# Patient Record
Sex: FEMALE | Race: BLACK OR AFRICAN AMERICAN | NOT HISPANIC OR LATINO | Employment: UNEMPLOYED | RURAL
[De-identification: names, ages, dates, MRNs, and addresses within clinical notes are randomized per-mention and may not be internally consistent; named-entity substitution may affect disease eponyms.]

---

## 2021-07-25 VITALS — HEIGHT: 66 IN | BODY MASS INDEX: 47.09 KG/M2 | WEIGHT: 293 LBS

## 2021-07-25 DIAGNOSIS — D86.9 SARCOIDOSIS: Primary | ICD-10-CM

## 2021-07-25 RX ORDER — PREDNISONE 5 MG/1
20 TABLET ORAL EVERY OTHER DAY
COMMUNITY
End: 2021-08-02 | Stop reason: SDUPTHER

## 2021-07-25 RX ORDER — LEVOTHYROXINE SODIUM 25 UG/1
25 TABLET ORAL
COMMUNITY
End: 2024-02-06

## 2021-07-25 RX ORDER — POTASSIUM CHLORIDE 20 MEQ/1
20 TABLET, EXTENDED RELEASE ORAL 2 TIMES DAILY PRN
COMMUNITY
End: 2024-02-06 | Stop reason: SDUPTHER

## 2021-07-25 RX ORDER — FUROSEMIDE 20 MG/1
20 TABLET ORAL DAILY PRN
COMMUNITY

## 2021-07-25 RX ORDER — METOCLOPRAMIDE HYDROCHLORIDE 5 MG/1
10 TABLET, ORALLY DISINTEGRATING ORAL 4 TIMES DAILY
COMMUNITY
End: 2024-02-06 | Stop reason: SDUPTHER

## 2021-08-02 ENCOUNTER — HOSPITAL ENCOUNTER (OUTPATIENT)
Dept: RADIOLOGY | Facility: HOSPITAL | Age: 51
Discharge: HOME | End: 2021-08-02
Attending: INTERNAL MEDICINE
Payer: COMMERCIAL

## 2021-08-02 ENCOUNTER — OFFICE VISIT (OUTPATIENT)
Dept: PULMONOLOGY | Facility: CLINIC | Age: 51
End: 2021-08-02
Payer: COMMERCIAL

## 2021-08-02 VITALS
SYSTOLIC BLOOD PRESSURE: 130 MMHG | HEART RATE: 83 BPM | BODY MASS INDEX: 48.82 KG/M2 | WEIGHT: 293 LBS | DIASTOLIC BLOOD PRESSURE: 68 MMHG | RESPIRATION RATE: 18 BRPM | HEIGHT: 65 IN | OXYGEN SATURATION: 93 %

## 2021-08-02 DIAGNOSIS — D86.9 SARCOIDOSIS: ICD-10-CM

## 2021-08-02 DIAGNOSIS — D86.9 SARCOID: ICD-10-CM

## 2021-08-02 PROCEDURE — 1159F MED LIST DOCD IN RCRD: CPT | Mod: ,,, | Performed by: INTERNAL MEDICINE

## 2021-08-02 PROCEDURE — 1159F PR MEDICATION LIST DOCUMENTED IN MEDICAL RECORD: ICD-10-PCS | Mod: ,,, | Performed by: INTERNAL MEDICINE

## 2021-08-02 PROCEDURE — 1160F RVW MEDS BY RX/DR IN RCRD: CPT | Mod: ,,, | Performed by: INTERNAL MEDICINE

## 2021-08-02 PROCEDURE — 99213 PR OFFICE/OUTPT VISIT, EST, LEVL III, 20-29 MIN: ICD-10-PCS | Mod: S$PBB,,, | Performed by: INTERNAL MEDICINE

## 2021-08-02 PROCEDURE — 1126F PR PAIN SEVERITY QUANTIFIED, NO PAIN PRESENT: ICD-10-PCS | Mod: ,,, | Performed by: INTERNAL MEDICINE

## 2021-08-02 PROCEDURE — 3078F PR MOST RECENT DIASTOLIC BLOOD PRESSURE < 80 MM HG: ICD-10-PCS | Mod: ,,, | Performed by: INTERNAL MEDICINE

## 2021-08-02 PROCEDURE — 1160F PR REVIEW ALL MEDS BY PRESCRIBER/CLIN PHARMACIST DOCUMENTED: ICD-10-PCS | Mod: ,,, | Performed by: INTERNAL MEDICINE

## 2021-08-02 PROCEDURE — 71046 XR CHEST PA AND LATERAL: ICD-10-PCS | Mod: 26,,, | Performed by: RADIOLOGY

## 2021-08-02 PROCEDURE — 1126F AMNT PAIN NOTED NONE PRSNT: CPT | Mod: ,,, | Performed by: INTERNAL MEDICINE

## 2021-08-02 PROCEDURE — 99214 OFFICE O/P EST MOD 30 MIN: CPT | Mod: PBBFAC | Performed by: INTERNAL MEDICINE

## 2021-08-02 PROCEDURE — 3008F PR BODY MASS INDEX (BMI) DOCUMENTED: ICD-10-PCS | Mod: ,,, | Performed by: INTERNAL MEDICINE

## 2021-08-02 PROCEDURE — 3075F PR MOST RECENT SYSTOLIC BLOOD PRESS GE 130-139MM HG: ICD-10-PCS | Mod: ,,, | Performed by: INTERNAL MEDICINE

## 2021-08-02 PROCEDURE — 71046 X-RAY EXAM CHEST 2 VIEWS: CPT | Mod: TC

## 2021-08-02 PROCEDURE — 71046 X-RAY EXAM CHEST 2 VIEWS: CPT | Mod: 26,,, | Performed by: RADIOLOGY

## 2021-08-02 PROCEDURE — 3078F DIAST BP <80 MM HG: CPT | Mod: ,,, | Performed by: INTERNAL MEDICINE

## 2021-08-02 PROCEDURE — 99213 OFFICE O/P EST LOW 20 MIN: CPT | Mod: S$PBB,,, | Performed by: INTERNAL MEDICINE

## 2021-08-02 PROCEDURE — 3008F BODY MASS INDEX DOCD: CPT | Mod: ,,, | Performed by: INTERNAL MEDICINE

## 2021-08-02 PROCEDURE — 3075F SYST BP GE 130 - 139MM HG: CPT | Mod: ,,, | Performed by: INTERNAL MEDICINE

## 2021-08-02 RX ORDER — PREDNISONE 5 MG/1
20 TABLET ORAL EVERY OTHER DAY
Qty: 30 TABLET | Refills: 5 | Status: SHIPPED | OUTPATIENT
Start: 2021-08-02 | End: 2021-10-07

## 2021-08-02 RX ORDER — ALBUTEROL SULFATE 90 UG/1
2 AEROSOL, METERED RESPIRATORY (INHALATION) EVERY 6 HOURS PRN
Qty: 18 G | Refills: 5 | Status: SHIPPED | OUTPATIENT
Start: 2021-08-02 | End: 2024-02-06

## 2021-08-04 ENCOUNTER — OUTSIDE PLACE OF SERVICE (OUTPATIENT)
Dept: CARDIOLOGY | Facility: CLINIC | Age: 51
End: 2021-08-04
Payer: COMMERCIAL

## 2021-08-04 PROCEDURE — 93306 TTE W/DOPPLER COMPLETE: CPT | Mod: 26,,, | Performed by: INTERNAL MEDICINE

## 2021-08-04 PROCEDURE — 93306 PR ECHO HEART XTHORACIC,COMPLETE W DOPPLER: ICD-10-PCS | Mod: 26,,, | Performed by: INTERNAL MEDICINE

## 2021-10-07 ENCOUNTER — OFFICE VISIT (OUTPATIENT)
Dept: PULMONOLOGY | Facility: CLINIC | Age: 51
End: 2021-10-07
Payer: COMMERCIAL

## 2021-10-07 VITALS
HEART RATE: 89 BPM | DIASTOLIC BLOOD PRESSURE: 78 MMHG | SYSTOLIC BLOOD PRESSURE: 122 MMHG | BODY MASS INDEX: 48.82 KG/M2 | WEIGHT: 293 LBS | OXYGEN SATURATION: 98 % | RESPIRATION RATE: 22 BRPM | HEIGHT: 65 IN

## 2021-10-07 DIAGNOSIS — D86.9 SARCOID: Primary | ICD-10-CM

## 2021-10-07 PROCEDURE — 99213 PR OFFICE/OUTPT VISIT, EST, LEVL III, 20-29 MIN: ICD-10-PCS | Mod: S$PBB,,, | Performed by: INTERNAL MEDICINE

## 2021-10-07 PROCEDURE — 99214 OFFICE O/P EST MOD 30 MIN: CPT | Mod: PBBFAC | Performed by: INTERNAL MEDICINE

## 2021-10-07 PROCEDURE — 1159F MED LIST DOCD IN RCRD: CPT | Mod: ,,, | Performed by: INTERNAL MEDICINE

## 2021-10-07 PROCEDURE — 3008F PR BODY MASS INDEX (BMI) DOCUMENTED: ICD-10-PCS | Mod: ,,, | Performed by: INTERNAL MEDICINE

## 2021-10-07 PROCEDURE — 3078F DIAST BP <80 MM HG: CPT | Mod: ,,, | Performed by: INTERNAL MEDICINE

## 2021-10-07 PROCEDURE — 3074F SYST BP LT 130 MM HG: CPT | Mod: ,,, | Performed by: INTERNAL MEDICINE

## 2021-10-07 PROCEDURE — 1159F PR MEDICATION LIST DOCUMENTED IN MEDICAL RECORD: ICD-10-PCS | Mod: ,,, | Performed by: INTERNAL MEDICINE

## 2021-10-07 PROCEDURE — 3008F BODY MASS INDEX DOCD: CPT | Mod: ,,, | Performed by: INTERNAL MEDICINE

## 2021-10-07 PROCEDURE — 3074F PR MOST RECENT SYSTOLIC BLOOD PRESSURE < 130 MM HG: ICD-10-PCS | Mod: ,,, | Performed by: INTERNAL MEDICINE

## 2021-10-07 PROCEDURE — 99213 OFFICE O/P EST LOW 20 MIN: CPT | Mod: S$PBB,,, | Performed by: INTERNAL MEDICINE

## 2021-10-07 PROCEDURE — 3078F PR MOST RECENT DIASTOLIC BLOOD PRESSURE < 80 MM HG: ICD-10-PCS | Mod: ,,, | Performed by: INTERNAL MEDICINE

## 2021-10-07 RX ORDER — PREDNISONE 5 MG/1
20 TABLET ORAL DAILY
Qty: 30 TABLET | Refills: 5 | Status: SHIPPED | OUTPATIENT
Start: 2021-10-07 | End: 2022-03-07

## 2021-11-05 ENCOUNTER — OFFICE VISIT (OUTPATIENT)
Dept: DERMATOLOGY | Facility: CLINIC | Age: 51
End: 2021-11-05
Payer: COMMERCIAL

## 2021-11-05 VITALS — HEIGHT: 64 IN | BODY MASS INDEX: 46.26 KG/M2 | WEIGHT: 271 LBS | RESPIRATION RATE: 18 BRPM

## 2021-11-05 DIAGNOSIS — Z79.899 HIGH RISK MEDICATION USE: ICD-10-CM

## 2021-11-05 DIAGNOSIS — D86.3 CUTANEOUS SARCOIDOSIS: Primary | ICD-10-CM

## 2021-11-05 PROCEDURE — 1160F PR REVIEW ALL MEDS BY PRESCRIBER/CLIN PHARMACIST DOCUMENTED: ICD-10-PCS | Mod: ,,, | Performed by: STUDENT IN AN ORGANIZED HEALTH CARE EDUCATION/TRAINING PROGRAM

## 2021-11-05 PROCEDURE — 99214 OFFICE O/P EST MOD 30 MIN: CPT | Mod: ,,, | Performed by: STUDENT IN AN ORGANIZED HEALTH CARE EDUCATION/TRAINING PROGRAM

## 2021-11-05 PROCEDURE — 1159F MED LIST DOCD IN RCRD: CPT | Mod: ,,, | Performed by: STUDENT IN AN ORGANIZED HEALTH CARE EDUCATION/TRAINING PROGRAM

## 2021-11-05 PROCEDURE — 1160F RVW MEDS BY RX/DR IN RCRD: CPT | Mod: ,,, | Performed by: STUDENT IN AN ORGANIZED HEALTH CARE EDUCATION/TRAINING PROGRAM

## 2021-11-05 PROCEDURE — 3008F PR BODY MASS INDEX (BMI) DOCUMENTED: ICD-10-PCS | Mod: ,,, | Performed by: STUDENT IN AN ORGANIZED HEALTH CARE EDUCATION/TRAINING PROGRAM

## 2021-11-05 PROCEDURE — 99214 PR OFFICE/OUTPT VISIT, EST, LEVL IV, 30-39 MIN: ICD-10-PCS | Mod: ,,, | Performed by: STUDENT IN AN ORGANIZED HEALTH CARE EDUCATION/TRAINING PROGRAM

## 2021-11-05 PROCEDURE — 1159F PR MEDICATION LIST DOCUMENTED IN MEDICAL RECORD: ICD-10-PCS | Mod: ,,, | Performed by: STUDENT IN AN ORGANIZED HEALTH CARE EDUCATION/TRAINING PROGRAM

## 2021-11-05 PROCEDURE — 3008F BODY MASS INDEX DOCD: CPT | Mod: ,,, | Performed by: STUDENT IN AN ORGANIZED HEALTH CARE EDUCATION/TRAINING PROGRAM

## 2021-11-07 RX ORDER — HYDROXYCHLOROQUINE SULFATE 200 MG/1
200 TABLET, FILM COATED ORAL DAILY
Qty: 30 TABLET | Refills: 2 | Status: SHIPPED | OUTPATIENT
Start: 2021-11-07 | End: 2022-02-17

## 2021-11-07 RX ORDER — TRIAMCINOLONE ACETONIDE 1 MG/G
OINTMENT TOPICAL 2 TIMES DAILY
Qty: 454 G | Refills: 5 | Status: SHIPPED | OUTPATIENT
Start: 2021-11-07 | End: 2023-11-01

## 2021-11-07 RX ORDER — TRIAMCINOLONE ACETONIDE 0.25 MG/G
OINTMENT TOPICAL 2 TIMES DAILY
Qty: 454 G | Refills: 5 | Status: SHIPPED | OUTPATIENT
Start: 2021-11-07 | End: 2024-02-06

## 2021-11-08 LAB
ALBUMIN SERPL BCP-MCNC: 3.1 G/DL (ref 3.5–5)
ALBUMIN/GLOB SERPL: 0.7 {RATIO}
ALP SERPL-CCNC: 92 U/L (ref 41–108)
ALT SERPL W P-5'-P-CCNC: 14 U/L (ref 13–56)
ANION GAP SERPL CALCULATED.3IONS-SCNC: 8 MMOL/L (ref 7–16)
AST SERPL W P-5'-P-CCNC: 17 U/L (ref 15–37)
BASOPHILS # BLD AUTO: 0.05 K/UL (ref 0–0.2)
BASOPHILS NFR BLD AUTO: 0.6 % (ref 0–1)
BILIRUB SERPL-MCNC: 0.4 MG/DL (ref 0–1.2)
BUN SERPL-MCNC: 10 MG/DL (ref 7–18)
BUN/CREAT SERPL: 9 (ref 6–20)
CALCIUM SERPL-MCNC: 8.9 MG/DL (ref 8.5–10.1)
CHLORIDE SERPL-SCNC: 108 MMOL/L (ref 98–107)
CO2 SERPL-SCNC: 29 MMOL/L (ref 21–32)
CREAT SERPL-MCNC: 1.09 MG/DL (ref 0.55–1.02)
DIFFERENTIAL METHOD BLD: ABNORMAL
EOSINOPHIL # BLD AUTO: 0.1 K/UL (ref 0–0.5)
EOSINOPHIL NFR BLD AUTO: 1.3 % (ref 1–4)
ERYTHROCYTE [DISTWIDTH] IN BLOOD BY AUTOMATED COUNT: 16.6 % (ref 11.5–14.5)
GLOBULIN SER-MCNC: 4.6 G/DL (ref 2–4)
GLUCOSE SERPL-MCNC: 119 MG/DL (ref 74–106)
HCT VFR BLD AUTO: 32.6 % (ref 38–47)
HGB BLD-MCNC: 10.2 G/DL (ref 12–16)
HYPOCHROMIA BLD QL SMEAR: NORMAL
IMM GRANULOCYTES # BLD AUTO: 0.06 K/UL (ref 0–0.04)
IMM GRANULOCYTES NFR BLD: 0.8 % (ref 0–0.4)
LYMPHOCYTES # BLD AUTO: 1.37 K/UL (ref 1–4.8)
LYMPHOCYTES NFR BLD AUTO: 17.6 % (ref 27–41)
MCH RBC QN AUTO: 22.8 PG (ref 27–31)
MCHC RBC AUTO-ENTMCNC: 31.3 G/DL (ref 32–36)
MCV RBC AUTO: 72.9 FL (ref 80–96)
MICROCYTES BLD QL SMEAR: NORMAL
MONOCYTES # BLD AUTO: 0.46 K/UL (ref 0–0.8)
MONOCYTES NFR BLD AUTO: 5.9 % (ref 2–6)
MPC BLD CALC-MCNC: 9.8 FL (ref 9.4–12.4)
NEUTROPHILS # BLD AUTO: 5.74 K/UL (ref 1.8–7.7)
NEUTROPHILS NFR BLD AUTO: 73.8 % (ref 53–65)
NRBC # BLD AUTO: 0 X10E3/UL
NRBC, AUTO (.00): 0 %
PLATELET # BLD AUTO: 233 K/UL (ref 150–400)
PLATELET MORPHOLOGY: NORMAL
POTASSIUM SERPL-SCNC: 3.4 MMOL/L (ref 3.5–5.1)
PROT SERPL-MCNC: 7.7 G/DL (ref 6.4–8.2)
RBC # BLD AUTO: 4.47 M/UL (ref 4.2–5.4)
SODIUM SERPL-SCNC: 142 MMOL/L (ref 136–145)
WBC # BLD AUTO: 7.78 K/UL (ref 4.5–11)

## 2021-11-08 PROCEDURE — 80053 COMPREHENSIVE METABOLIC PANEL: ICD-10-PCS | Mod: ,,, | Performed by: CLINICAL MEDICAL LABORATORY

## 2021-11-08 PROCEDURE — 36415 PR COLLECTION VENOUS BLOOD,VENIPUNCTURE: ICD-10-PCS | Mod: ,,, | Performed by: CLINICAL MEDICAL LABORATORY

## 2021-11-08 PROCEDURE — 85025 CBC WITH DIFFERENTIAL: ICD-10-PCS | Mod: ,,, | Performed by: CLINICAL MEDICAL LABORATORY

## 2021-11-08 PROCEDURE — 80053 COMPREHEN METABOLIC PANEL: CPT | Mod: ,,, | Performed by: CLINICAL MEDICAL LABORATORY

## 2021-11-08 PROCEDURE — 36415 COLL VENOUS BLD VENIPUNCTURE: CPT | Mod: ,,, | Performed by: CLINICAL MEDICAL LABORATORY

## 2021-11-08 PROCEDURE — 85025 COMPLETE CBC W/AUTO DIFF WBC: CPT | Mod: ,,, | Performed by: CLINICAL MEDICAL LABORATORY

## 2021-12-06 ENCOUNTER — OFFICE VISIT (OUTPATIENT)
Dept: PULMONOLOGY | Facility: CLINIC | Age: 51
End: 2021-12-06
Payer: COMMERCIAL

## 2021-12-06 VITALS
OXYGEN SATURATION: 98 % | BODY MASS INDEX: 46.26 KG/M2 | RESPIRATION RATE: 16 BRPM | WEIGHT: 271 LBS | DIASTOLIC BLOOD PRESSURE: 68 MMHG | HEIGHT: 64 IN | SYSTOLIC BLOOD PRESSURE: 120 MMHG | HEART RATE: 64 BPM

## 2021-12-06 DIAGNOSIS — D86.9 SARCOID: Primary | ICD-10-CM

## 2021-12-06 PROCEDURE — 99213 OFFICE O/P EST LOW 20 MIN: CPT | Mod: S$PBB,,, | Performed by: INTERNAL MEDICINE

## 2021-12-06 PROCEDURE — 99213 PR OFFICE/OUTPT VISIT, EST, LEVL III, 20-29 MIN: ICD-10-PCS | Mod: S$PBB,,, | Performed by: INTERNAL MEDICINE

## 2021-12-06 PROCEDURE — 99214 OFFICE O/P EST MOD 30 MIN: CPT | Mod: PBBFAC | Performed by: INTERNAL MEDICINE

## 2021-12-06 RX ORDER — BUDESONIDE AND FORMOTEROL FUMARATE DIHYDRATE 160; 4.5 UG/1; UG/1
2 AEROSOL RESPIRATORY (INHALATION) EVERY 12 HOURS
Qty: 10.2 G | Refills: 5 | Status: SHIPPED | OUTPATIENT
Start: 2021-12-06 | End: 2024-02-06

## 2022-01-18 ENCOUNTER — TELEPHONE (OUTPATIENT)
Dept: INTERNAL MEDICINE | Facility: CLINIC | Age: 52
End: 2022-01-18
Payer: COMMERCIAL

## 2022-01-18 NOTE — TELEPHONE ENCOUNTER
"Pt phoned today and requested MD advice.    Reported she was at [facility] ER yesterday and was told she had Pneumonia and was told to "call Raj".    She confirmed Neg Covid test art ER yesterday.    Confirmed was ordered Levaquin 500 daily for 7 days: has not yet started med    Confirmed "some" feverish feeling    Confirmed she is on Plaquenil 200 daily  Confirmed current dose Prednisone 5 mg qod   (as instructed last appt)    Is not on Albuterol HFA or Symbicort ( prev cost issue)    Has March appt scheduled.    [facility] med records was called: ER note requested.     was told MD is at [facility]  Clinic today:   Her report will be forwarded to MD: and return call to her after his recommendation later today or tomorrow.  She was encouraged to start antibx Levaquin as ordered.//gp    "

## 2022-01-19 ENCOUNTER — TELEPHONE (OUTPATIENT)
Dept: INTERNAL MEDICINE | Facility: CLINIC | Age: 52
End: 2022-01-19
Payer: COMMERCIAL

## 2022-01-19 DIAGNOSIS — D86.9 SARCOIDOSIS: ICD-10-CM

## 2022-01-19 DIAGNOSIS — J18.9 PNEUMONIA DUE TO INFECTIOUS ORGANISM, UNSPECIFIED LATERALITY, UNSPECIFIED PART OF LUNG: Primary | ICD-10-CM

## 2022-01-19 NOTE — TELEPHONE ENCOUNTER
"Call returned to  as follow-up from yesterday's phone call.  Confirmed that [facility] ER records were reviewed  (Bilat Pneumonia on CXR).    She was told that  does recommend she take Levaquin as ordered.  Adding CXR to 3/7/22 clinic appointment (that as previously scheduled).    Ms Jim requested rf on Prednisone   Currently on Prednisone 5 mg qod:  RF Called to [facility].  "take as directed #30 rf3.  Per VO Raj/gp  "

## 2022-03-07 ENCOUNTER — OFFICE VISIT (OUTPATIENT)
Dept: PULMONOLOGY | Facility: CLINIC | Age: 52
End: 2022-03-07
Payer: COMMERCIAL

## 2022-03-07 ENCOUNTER — HOSPITAL ENCOUNTER (OUTPATIENT)
Dept: RADIOLOGY | Facility: HOSPITAL | Age: 52
Discharge: HOME | End: 2022-03-07
Attending: INTERNAL MEDICINE
Payer: COMMERCIAL

## 2022-03-07 VITALS
SYSTOLIC BLOOD PRESSURE: 120 MMHG | DIASTOLIC BLOOD PRESSURE: 70 MMHG | OXYGEN SATURATION: 97 % | RESPIRATION RATE: 16 BRPM | HEIGHT: 64 IN | HEART RATE: 87 BPM | BODY MASS INDEX: 46.26 KG/M2 | WEIGHT: 271 LBS

## 2022-03-07 DIAGNOSIS — J18.9 PNEUMONIA DUE TO INFECTIOUS ORGANISM, UNSPECIFIED LATERALITY, UNSPECIFIED PART OF LUNG: ICD-10-CM

## 2022-03-07 DIAGNOSIS — D86.9 SARCOID: ICD-10-CM

## 2022-03-07 DIAGNOSIS — E66.01 CLASS 3 SEVERE OBESITY DUE TO EXCESS CALORIES WITHOUT SERIOUS COMORBIDITY WITH BODY MASS INDEX (BMI) OF 45.0 TO 49.9 IN ADULT: ICD-10-CM

## 2022-03-07 DIAGNOSIS — D86.9 SARCOIDOSIS: ICD-10-CM

## 2022-03-07 PROBLEM — E66.813 CLASS 3 SEVERE OBESITY IN ADULT: Status: ACTIVE | Noted: 2022-03-07

## 2022-03-07 PROCEDURE — 71046 X-RAY EXAM CHEST 2 VIEWS: CPT | Mod: 26,,, | Performed by: RADIOLOGY

## 2022-03-07 PROCEDURE — 3078F PR MOST RECENT DIASTOLIC BLOOD PRESSURE < 80 MM HG: ICD-10-PCS | Mod: ,,, | Performed by: INTERNAL MEDICINE

## 2022-03-07 PROCEDURE — 3074F SYST BP LT 130 MM HG: CPT | Mod: ,,, | Performed by: INTERNAL MEDICINE

## 2022-03-07 PROCEDURE — 3008F BODY MASS INDEX DOCD: CPT | Mod: ,,, | Performed by: INTERNAL MEDICINE

## 2022-03-07 PROCEDURE — 71046 XR CHEST PA AND LATERAL: ICD-10-PCS | Mod: 26,,, | Performed by: RADIOLOGY

## 2022-03-07 PROCEDURE — 71046 X-RAY EXAM CHEST 2 VIEWS: CPT | Mod: TC

## 2022-03-07 PROCEDURE — 99213 PR OFFICE/OUTPT VISIT, EST, LEVL III, 20-29 MIN: ICD-10-PCS | Mod: S$PBB,,, | Performed by: INTERNAL MEDICINE

## 2022-03-07 PROCEDURE — 3078F DIAST BP <80 MM HG: CPT | Mod: ,,, | Performed by: INTERNAL MEDICINE

## 2022-03-07 PROCEDURE — 1159F MED LIST DOCD IN RCRD: CPT | Mod: ,,, | Performed by: INTERNAL MEDICINE

## 2022-03-07 PROCEDURE — 1159F PR MEDICATION LIST DOCUMENTED IN MEDICAL RECORD: ICD-10-PCS | Mod: ,,, | Performed by: INTERNAL MEDICINE

## 2022-03-07 PROCEDURE — 99214 OFFICE O/P EST MOD 30 MIN: CPT | Mod: PBBFAC,25 | Performed by: INTERNAL MEDICINE

## 2022-03-07 PROCEDURE — 3074F PR MOST RECENT SYSTOLIC BLOOD PRESSURE < 130 MM HG: ICD-10-PCS | Mod: ,,, | Performed by: INTERNAL MEDICINE

## 2022-03-07 PROCEDURE — 99213 OFFICE O/P EST LOW 20 MIN: CPT | Mod: S$PBB,,, | Performed by: INTERNAL MEDICINE

## 2022-03-07 PROCEDURE — 3008F PR BODY MASS INDEX (BMI) DOCUMENTED: ICD-10-PCS | Mod: ,,, | Performed by: INTERNAL MEDICINE

## 2022-03-07 RX ORDER — PREDNISONE 5 MG/1
10 TABLET ORAL EVERY OTHER DAY
Qty: 30 TABLET | Refills: 5 | Status: SHIPPED | OUTPATIENT
Start: 2022-03-07 | End: 2024-02-06

## 2022-03-07 NOTE — PROGRESS NOTES
Subjective:       Patient ID: Zahra Jim is a 51 y.o. female.    Chief Complaint: Sarcoidosis (3 month followup with CXR)    Sarcoidosis  This is a chronic problem. The current episode started more than 1 month ago. The problem occurs constantly. The problem has been unchanged. Pertinent negatives include no abdominal pain, arthralgias, chest pain, chills, congestion, headaches or rash. Nothing aggravates the symptoms. She has tried nothing for the symptoms. The treatment provided no relief.     Past Medical History:   Diagnosis Date    Collapse, lung     Diabetes mellitus, type 2     with Gastroparesis    Edema     Gastroparesis     Low back pain     Sarcoidosis     Lung and Cutaneous Sarcoid    Thyroid disease     Hypothyroid     Past Surgical History:   Procedure Laterality Date    CHEST TUBE INSERTION       Family History   Problem Relation Age of Onset    Diabetes Mother     Hypertension Mother     Hypertension Father      Review of patient's allergies indicates:   Allergen Reactions    Bactrim [sulfamethoxazole-trimethoprim] Hives    Pcn [penicillins] Hives      Social History     Tobacco Use    Smoking status: Never Smoker    Smokeless tobacco: Never Used   Substance Use Topics    Alcohol use: Yes     Comment: rarely, with celebrations    Drug use: Never      Review of Systems   Constitutional: Negative for chills, activity change and night sweats.   HENT: Negative for congestion and ear pain.    Eyes: Negative for redness and itching.   Cardiovascular: Negative for chest pain and palpitations.   Musculoskeletal: Negative for arthralgias and back pain.   Skin: Negative for rash.   Gastrointestinal: Negative for abdominal pain and abdominal distention.   Neurological: Negative for dizziness and headaches.   Hematological: Negative for adenopathy. Does not bruise/bleed easily.   Psychiatric/Behavioral: Negative for confusion. The patient is not nervous/anxious.        Objective:       Physical Exam   Constitutional: She is oriented to person, place, and time. She appears well-developed and well-nourished.   HENT:   Head: Normocephalic.   Nose: Nose normal.   Mouth/Throat: Oropharynx is clear and moist.   Neck: No JVD present. No thyromegaly present.   Cardiovascular: Normal rate, regular rhythm, normal heart sounds and intact distal pulses.   Pulmonary/Chest: Normal expansion, hyperinflation, symmetric chest wall expansion, effort normal and breath sounds normal.   Abdominal: Soft. Bowel sounds are normal.   Musculoskeletal:         General: Normal range of motion.      Cervical back: Normal range of motion and neck supple.   Lymphadenopathy: No supraclavicular adenopathy is present.     She has no cervical adenopathy.     She has no axillary adenopathy.   Neurological: She is alert and oriented to person, place, and time. She has normal reflexes.   Skin: Skin is warm and dry.   Psychiatric: She has a normal mood and affect. Her behavior is normal.     Personal Diagnostic Review  Chest x-ray seems unchanged bilateral adenopathy interstitial changes    No flowsheet data found.      Assessment:       1. Sarcoid    2. Class 3 severe obesity due to excess calories without serious comorbidity with body mass index (BMI) of 45.0 to 49.9 in adult        Outpatient Encounter Medications as of 3/7/2022   Medication Sig Dispense Refill    furosemide (LASIX) 20 MG tablet Take 20 mg by mouth daily as needed.      hydrOXYchloroQUINE (PLAQUENIL) 200 mg tablet TAKE 1 TABLET(200 MG) BY MOUTH EVERY DAY 30 tablet 2    levothyroxine (SYNTHROID) 25 MCG tablet Take 25 mcg by mouth before breakfast.      metoclopramide HCl 5 mg TbDL Take 10 mg by mouth 4 (four) times daily.      potassium chloride SA (K-DUR,KLOR-CON) 20 MEQ tablet Take 20 mEq by mouth 2 (two) times daily as needed.      triamcinolone acetonide 0.025% (KENALOG) 0.025 % Oint Apply topically 2 (two) times daily. For the face 454 g 5     triamcinolone acetonide 0.1% (KENALOG) 0.1 % ointment Apply topically 2 (two) times daily. For the body 454 g 5    [DISCONTINUED] predniSONE (DELTASONE) 5 MG tablet Take 4 tablets (20 mg total) by mouth once daily. Tapering dose: ordered 6/14/21: 4>3>2>1:   1 week each dose 30 tablet 5    albuterol (VENTOLIN HFA) 90 mcg/actuation inhaler Inhale 2 puffs into the lungs every 6 (six) hours as needed for Wheezing. Rescue (Patient not taking: No sig reported) 18 g 5    budesonide-formoterol 160-4.5 mcg (SYMBICORT) 160-4.5 mcg/actuation HFAA Inhale 2 puffs into the lungs every 12 (twelve) hours. Controller 10.2 g 5    predniSONE (DELTASONE) 5 MG tablet Take 2 tablets (10 mg total) by mouth every other day. Tapering dose: ordered 6/14/21: 4>3>2>1:   1 week each dose 30 tablet 5     No facility-administered encounter medications on file as of 3/7/2022.     No orders of the defined types were placed in this encounter.      Plan:       Problem List Items Addressed This Visit        Immunology/Multi System    Sarcoid     Patient currently having no problems with her sarcoid from respiratory standpoint mainly skin lesions a came back up after we went to 5 mg every other day on the prednisone she quit using her inhalers she is doing breath breathing exercise he short of breath with exertion but okay at rest no other issues.  My plan is to go to prednisone 10 mg every other day see her back in 6 months.  Weight loss discussed           Relevant Medications    predniSONE (DELTASONE) 5 MG tablet       Other    Class 3 severe obesity in adult     .  Obesity does make her more short of breath

## 2022-03-07 NOTE — ASSESSMENT & PLAN NOTE
Patient currently having no problems with her sarcoid from respiratory standpoint mainly skin lesions a came back up after we went to 5 mg every other day on the prednisone she quit using her inhalers she is doing breath breathing exercise he short of breath with exertion but okay at rest no other issues.  My plan is to go to prednisone 10 mg every other day see her back in 6 months.  Weight loss discussed

## 2023-04-16 DIAGNOSIS — R60.0 EDEMA, LOWER EXTREMITY: Primary | ICD-10-CM

## 2023-04-18 ENCOUNTER — OFFICE VISIT (OUTPATIENT)
Dept: VASCULAR SURGERY | Facility: CLINIC | Age: 53
End: 2023-04-18
Payer: COMMERCIAL

## 2023-04-18 ENCOUNTER — HOSPITAL ENCOUNTER (OUTPATIENT)
Dept: RADIOLOGY | Facility: HOSPITAL | Age: 53
Discharge: HOME | End: 2023-04-18
Attending: FAMILY MEDICINE
Payer: COMMERCIAL

## 2023-04-18 VITALS
RESPIRATION RATE: 14 BRPM | DIASTOLIC BLOOD PRESSURE: 60 MMHG | WEIGHT: 271 LBS | HEART RATE: 76 BPM | HEIGHT: 64 IN | SYSTOLIC BLOOD PRESSURE: 126 MMHG | BODY MASS INDEX: 46.26 KG/M2

## 2023-04-18 DIAGNOSIS — M79.604 LEG PAIN, BILATERAL: ICD-10-CM

## 2023-04-18 DIAGNOSIS — M79.605 LEG PAIN, BILATERAL: ICD-10-CM

## 2023-04-18 DIAGNOSIS — R60.0 EDEMA, LOWER EXTREMITY: ICD-10-CM

## 2023-04-18 DIAGNOSIS — L81.9 HYPERPIGMENTATION OF SKIN: Primary | ICD-10-CM

## 2023-04-18 DIAGNOSIS — I87.2 VENOUS INSUFFICIENCY: ICD-10-CM

## 2023-04-18 PROCEDURE — 3008F BODY MASS INDEX DOCD: CPT | Mod: CPTII,,, | Performed by: FAMILY MEDICINE

## 2023-04-18 PROCEDURE — 93970 EXTREMITY STUDY: CPT | Mod: 26,,, | Performed by: FAMILY MEDICINE

## 2023-04-18 PROCEDURE — 3074F PR MOST RECENT SYSTOLIC BLOOD PRESSURE < 130 MM HG: ICD-10-PCS | Mod: CPTII,,, | Performed by: FAMILY MEDICINE

## 2023-04-18 PROCEDURE — 1159F PR MEDICATION LIST DOCUMENTED IN MEDICAL RECORD: ICD-10-PCS | Mod: CPTII,,, | Performed by: FAMILY MEDICINE

## 2023-04-18 PROCEDURE — 1159F MED LIST DOCD IN RCRD: CPT | Mod: CPTII,,, | Performed by: FAMILY MEDICINE

## 2023-04-18 PROCEDURE — 93970 EXTREMITY STUDY: CPT | Mod: TC,PN

## 2023-04-18 PROCEDURE — 99204 PR OFFICE/OUTPT VISIT, NEW, LEVL IV, 45-59 MIN: ICD-10-PCS | Mod: ,,, | Performed by: FAMILY MEDICINE

## 2023-04-18 PROCEDURE — 99204 OFFICE O/P NEW MOD 45 MIN: CPT | Mod: ,,, | Performed by: FAMILY MEDICINE

## 2023-04-18 PROCEDURE — 3078F PR MOST RECENT DIASTOLIC BLOOD PRESSURE < 80 MM HG: ICD-10-PCS | Mod: CPTII,,, | Performed by: FAMILY MEDICINE

## 2023-04-18 PROCEDURE — 3008F PR BODY MASS INDEX (BMI) DOCUMENTED: ICD-10-PCS | Mod: CPTII,,, | Performed by: FAMILY MEDICINE

## 2023-04-18 PROCEDURE — 3078F DIAST BP <80 MM HG: CPT | Mod: CPTII,,, | Performed by: FAMILY MEDICINE

## 2023-04-18 PROCEDURE — 1160F PR REVIEW ALL MEDS BY PRESCRIBER/CLIN PHARMACIST DOCUMENTED: ICD-10-PCS | Mod: CPTII,,, | Performed by: FAMILY MEDICINE

## 2023-04-18 PROCEDURE — 1160F RVW MEDS BY RX/DR IN RCRD: CPT | Mod: CPTII,,, | Performed by: FAMILY MEDICINE

## 2023-04-18 PROCEDURE — 3074F SYST BP LT 130 MM HG: CPT | Mod: CPTII,,, | Performed by: FAMILY MEDICINE

## 2023-04-18 PROCEDURE — 93970 US VENOUS REFLUX STUDY BILATERAL: ICD-10-PCS | Mod: 26,,, | Performed by: FAMILY MEDICINE

## 2023-04-18 RX ORDER — NITROFURANTOIN 25; 75 MG/1; MG/1
100 CAPSULE ORAL 2 TIMES DAILY
COMMUNITY
Start: 2023-03-10 | End: 2024-02-06

## 2023-04-18 RX ORDER — INSULIN LISPRO 100 [IU]/ML
INJECTION, SOLUTION INTRAVENOUS; SUBCUTANEOUS
COMMUNITY
Start: 2022-06-06 | End: 2024-02-06

## 2023-04-18 RX ORDER — AMITRIPTYLINE HYDROCHLORIDE 25 MG/1
25 TABLET, FILM COATED ORAL NIGHTLY
COMMUNITY
Start: 2023-03-09 | End: 2024-02-06

## 2023-04-18 RX ORDER — CHLORHEXIDINE GLUCONATE ORAL RINSE 1.2 MG/ML
SOLUTION DENTAL
COMMUNITY
Start: 2023-01-11 | End: 2024-02-06

## 2023-04-18 RX ORDER — PREDNISONE 5 MG/1
10 TABLET ORAL EVERY OTHER DAY
COMMUNITY
End: 2024-02-06

## 2023-04-18 RX ORDER — NYSTATIN 100000 [USP'U]/G
POWDER TOPICAL 2 TIMES DAILY
COMMUNITY
Start: 2022-06-06 | End: 2024-02-06

## 2023-04-18 NOTE — PROGRESS NOTES
[facility] CLINIC NOTE    Patient ID: Zahra Gandara is a 52 y.o. female.    I. HISTORY     Chief Complaint:   Chief Complaint   Patient presents with    Leg Swelling     Room 4. NP RF DR. ALBERTINA SILVESTRE ANNABELLE LOWER EDEMA        HPI: Zahra Gandara is a 52 y.o. female who is referred here today by Dr. Albertina Silvestre for evaluation of bilateral lower extremity edema, ulcerations and discomfort.  Symptoms are persistent/worsening and began approximately 2-3 years ago.  Location is bilateral lower extremities below the knees. Symptoms are worse at the end of the day.  History of venous interventions includes none.  Denies family history of venous disease.  Patient also denies DVT or cellulitis.  She also has a history of sarcoidosis with skin lesions of the upper and lower extremities.    The patient underwent a bilateral complete venous reflux study today and the results were discussed with the patient.  This study shows no evidence of DVT bilaterally.  The study also shows proximal reflux in the right great saphenous vein.  No other superficial reflux noted.    Venous Disease Medical Necessity Documentation Initial Visit Date: 4/18/2023 Return Check Date:    Have you ever had a rupture or bleed from a varicose vein in your leg(s)?              [] Yes  [x] No   [] Yes   [] No   Have you ever been diagnosed with phlebitis, cellulitis, or inflammation in the area of the varicose veins of  your leg(s)?  [] Yes  [x] No    [] Yes   [] No   Do you have darkened or inflamed skin on your legs?   [x] Yes   [] No   [] Yes   [] No   Do you have leg swelling?     [x] Yes   [] No   [] Yes   [] No   Do you have leg pain? [x]   Yes   [] No   [] Yes   [] No   If yes, describe the type of pain?    []   Stabbing []  Radiating [x]  Aching   [x]  Tightness []  Throbbing               [x]  Burning []  Cramping              Do you have leg discomfort?   [x] Yes   [] No   [] Yes   [] No   If yes, describe the type of discomfort?    [x]   Heaviness [x]  Fullness   []  Restlessness [x] Tired/Fatigued [] Itching              Have you ever worn compression hose?   [x] Yes   [] No   [] Yes   [] No   If yes, how long? 1YR          Do you elevate your legs while sitting?   [x] Yes   [] No   [] Yes   [] No   Does venous disease (varicose veins, ulcers, skin changes, leg pain/swelling) interfere with your daily life?  If yes, check activities you are limited or unable to do.    [x]  Shower  [x]   Walk  []  Exercise  [] Play with children/grandchildren  []  Shop [] Work [x] Stand for any period of time [x] Sleep                               [x] Sitting for an extended period of time.           [x] Yes   [] No   [] Yes   [] No   Do you exercise/have you tried to exercise (i.e.  Walk our participate in a regular exercise routine)?  [] Yes  [x] No   [] Yes   [] No   BMI 46.5             Past Medical History:   Diagnosis Date    Collapse, lung     Diabetes mellitus, type 2     with Gastroparesis    Edema     Gastroparesis     Low back pain     Sarcoidosis     Lung and Cutaneous Sarcoid    Thyroid disease     Hypothyroid        Past Surgical History:   Procedure Laterality Date    CHEST TUBE INSERTION         Social History     Tobacco Use   Smoking Status Never   Smokeless Tobacco Never         Current Outpatient Medications:     albuterol (VENTOLIN HFA) 90 mcg/actuation inhaler, Inhale 2 puffs into the lungs every 6 (six) hours as needed for Wheezing. Rescue, Disp: 18 g, Rfl: 5    amitriptyline (ELAVIL) 25 MG tablet, Take 25 mg by mouth every evening., Disp: , Rfl:     chlorhexidine (PERIDEX) 0.12 % solution, TAKE AS DIRECTED, Disp: , Rfl:     furosemide (LASIX) 20 MG tablet, Take 20 mg by mouth daily as needed., Disp: , Rfl:     hydrOXYchloroQUINE (PLAQUENIL) 200 mg tablet, TAKE 1 TABLET(200 MG) BY MOUTH EVERY DAY, Disp: 30 tablet, Rfl: 2    insulin lispro 100 unit/mL injection, Inject into the skin., Disp: , Rfl:     levothyroxine (SYNTHROID) 25 MCG tablet,  Take 25 mcg by mouth before breakfast., Disp: , Rfl:     metoclopramide HCl 5 mg TbDL, Take 10 mg by mouth 4 (four) times daily., Disp: , Rfl:     nitrofurantoin, macrocrystal-monohydrate, (MACROBID) 100 MG capsule, Take 100 mg by mouth 2 (two) times daily., Disp: , Rfl:     nystatin (MYCOSTATIN) powder, Apply topically 2 (two) times daily., Disp: , Rfl:     potassium chloride SA (K-DUR,KLOR-CON) 20 MEQ tablet, Take 20 mEq by mouth 2 (two) times daily as needed., Disp: , Rfl:     predniSONE (DELTASONE) 5 MG tablet, Take 2 tablets (10 mg total) by mouth every other day. Tapering dose: ordered 6/14/21: 4>3>2>1:   1 week each dose, Disp: 30 tablet, Rfl: 5    predniSONE (DELTASONE) 5 MG tablet, Take 10 mg by mouth every other day., Disp: , Rfl:     triamcinolone acetonide 0.025% (KENALOG) 0.025 % Oint, Apply topically 2 (two) times daily. For the face, Disp: 454 g, Rfl: 5    triamcinolone acetonide 0.1% (KENALOG) 0.1 % ointment, Apply topically 2 (two) times daily. For the body, Disp: 454 g, Rfl: 5    budesonide-formoterol 160-4.5 mcg (SYMBICORT) 160-4.5 mcg/actuation HFAA, Inhale 2 puffs into the lungs every 12 (twelve) hours. Controller, Disp: 10.2 g, Rfl: 5    Review of Systems   Constitutional:  Negative for activity change, chills, diaphoresis, fatigue and fever.   Respiratory:  Negative for cough and shortness of breath.    Cardiovascular:  Positive for leg swelling. Negative for chest pain and claudication.        Hyperpigmentation LE   Gastrointestinal:  Negative for nausea and vomiting.   Musculoskeletal:  Positive for leg pain. Negative for joint swelling.   Integumentary:  Negative for rash and wound.   Neurological:  Negative for weakness and numbness.        II. PHYSICAL EXAM     Physical Exam  Constitutional:       General: She is awake. She is not in acute distress.     Appearance: Normal appearance. She is obese. She is not ill-appearing or toxic-appearing.   HENT:      Head: Normocephalic and  atraumatic.   Eyes:      Extraocular Movements: Extraocular movements intact.      Conjunctiva/sclera: Conjunctivae normal.      Pupils: Pupils are equal, round, and reactive to light.   Neck:      Vascular: No carotid bruit or JVD.   Cardiovascular:      Rate and Rhythm: Normal rate and regular rhythm.      Pulses:           Dorsalis pedis pulses are detected w/ Doppler on the right side and detected w/ Doppler on the left side.        Posterior tibial pulses are detected w/ Doppler on the right side and detected w/ Doppler on the left side.      Heart sounds: No murmur heard.  Pulmonary:      Effort: Pulmonary effort is normal. No respiratory distress.      Breath sounds: No stridor. No wheezing, rhonchi or rales.   Musculoskeletal:         General: No swelling, tenderness or deformity.      Right lower le+ Pitting Edema present.      Left lower le+ Pitting Edema present.      Comments: Edema with scattered superficial skin lesions bilaterally.  No evidence of active cellulitis or weeping.   Feet:      Comments: Triphasic hand-held dopplerable pulses of the bilateral dorsalis pedis arteries.  Biphasic hand-held dopplerable pulses of the bilateral posterior tibial arteries.  Skin:     General: Skin is warm.      Capillary Refill: Capillary refill takes less than 2 seconds.      Coloration: Skin is not ashen.      Findings: No bruising, erythema, lesion, rash or wound.   Neurological:      Mental Status: She is alert and oriented to person, place, and time.      Motor: No weakness.   Psychiatric:         Speech: Speech normal.         Behavior: Behavior normal. Behavior is cooperative.       Reticular/Spider veins noted:  RLE: none  LLE: none    Varicose veins noted:  RLE: none  LLE:  none    CEAP Classification  Clinical Signs: Class 4 - Skin changes ascribed to venous disease  Etiologic Classification: Primary  Anatomic distribution: Superficial  Pathophysiologic dysfunction: Reflux                        Venous Clinical Severity Score  Pain:2=Daily, moderate activity limitation, occasional analgesics  Varicose Veins: 0=None  Venous Edema: 3=Morning edema above ankle and requiring activity change, elevation  Pigmentation: 1=Diffuse, but limited in area and old (brown)  Inflammation: 0=None  Induration: 1=Focal, circummalleolar (< 5 cm)  Number of Active Ulcers: 0=0  Active Ulceration, Duration: 0=None  Active Ulcer Size: 0=None  Compressive Therapy: 0=Not used or not compliant  Total Score: 7     III. ASSESSMENT & PLAN (MEDICAL DECISION MAKING)     1. Hyperpigmentation of skin    2. Edema, lower extremity    3. Venous insufficiency    4. Leg pain, bilateral        Assessment/Diagnosis and Plan:  Ultrasound of lower extremities reveals positive evidence of venous insufficiency in the right great saphenous vein.  Plan for conservative medical treatment at this time. The patient may benefit from endovenous ablation in the future.     - Start pink unna boot and Coban 2 wraps of the bilateral extremities.  I will write home health orders for twice weekly wrap changes to this effect.  - Therapeutic leg elevation  - Calf pumping exercises  - RTC 1 months for further evaluation        Hilario Lizama DO

## 2023-05-16 ENCOUNTER — OFFICE VISIT (OUTPATIENT)
Dept: VASCULAR SURGERY | Facility: CLINIC | Age: 53
End: 2023-05-16
Payer: COMMERCIAL

## 2023-05-16 VITALS
BODY MASS INDEX: 47.46 KG/M2 | HEIGHT: 64 IN | RESPIRATION RATE: 16 BRPM | DIASTOLIC BLOOD PRESSURE: 68 MMHG | WEIGHT: 278 LBS | HEART RATE: 108 BPM | SYSTOLIC BLOOD PRESSURE: 126 MMHG

## 2023-05-16 DIAGNOSIS — I87.2 VENOUS INSUFFICIENCY: ICD-10-CM

## 2023-05-16 DIAGNOSIS — M79.604 LEG PAIN, BILATERAL: ICD-10-CM

## 2023-05-16 DIAGNOSIS — R60.0 EDEMA, LOWER EXTREMITY: ICD-10-CM

## 2023-05-16 DIAGNOSIS — M79.605 LEG PAIN, BILATERAL: ICD-10-CM

## 2023-05-16 DIAGNOSIS — L81.9 HYPERPIGMENTATION OF SKIN: Primary | ICD-10-CM

## 2023-05-16 PROCEDURE — 3008F PR BODY MASS INDEX (BMI) DOCUMENTED: ICD-10-PCS | Mod: CPTII,,, | Performed by: FAMILY MEDICINE

## 2023-05-16 PROCEDURE — 1159F PR MEDICATION LIST DOCUMENTED IN MEDICAL RECORD: ICD-10-PCS | Mod: CPTII,,, | Performed by: FAMILY MEDICINE

## 2023-05-16 PROCEDURE — 3074F PR MOST RECENT SYSTOLIC BLOOD PRESSURE < 130 MM HG: ICD-10-PCS | Mod: CPTII,,, | Performed by: FAMILY MEDICINE

## 2023-05-16 PROCEDURE — 1159F MED LIST DOCD IN RCRD: CPT | Mod: CPTII,,, | Performed by: FAMILY MEDICINE

## 2023-05-16 PROCEDURE — 99214 OFFICE O/P EST MOD 30 MIN: CPT | Mod: ,,, | Performed by: FAMILY MEDICINE

## 2023-05-16 PROCEDURE — 99214 PR OFFICE/OUTPT VISIT, EST, LEVL IV, 30-39 MIN: ICD-10-PCS | Mod: ,,, | Performed by: FAMILY MEDICINE

## 2023-05-16 PROCEDURE — 3074F SYST BP LT 130 MM HG: CPT | Mod: CPTII,,, | Performed by: FAMILY MEDICINE

## 2023-05-16 PROCEDURE — 3008F BODY MASS INDEX DOCD: CPT | Mod: CPTII,,, | Performed by: FAMILY MEDICINE

## 2023-05-16 PROCEDURE — 1160F PR REVIEW ALL MEDS BY PRESCRIBER/CLIN PHARMACIST DOCUMENTED: ICD-10-PCS | Mod: CPTII,,, | Performed by: FAMILY MEDICINE

## 2023-05-16 PROCEDURE — 3078F DIAST BP <80 MM HG: CPT | Mod: CPTII,,, | Performed by: FAMILY MEDICINE

## 2023-05-16 PROCEDURE — 3078F PR MOST RECENT DIASTOLIC BLOOD PRESSURE < 80 MM HG: ICD-10-PCS | Mod: CPTII,,, | Performed by: FAMILY MEDICINE

## 2023-05-16 PROCEDURE — 1160F RVW MEDS BY RX/DR IN RCRD: CPT | Mod: CPTII,,, | Performed by: FAMILY MEDICINE

## 2023-05-16 NOTE — PROGRESS NOTES
[facility] CLINIC NOTE    Patient ID: Zahra Gandara is a 52 y.o. female.    I. HISTORY     Chief Complaint:   Chief Complaint   Patient presents with    Edema     1M WRAP CK, pink UNNA w/ 3MCoban 2 Lite        HPI: Zahra Gandara is a 52 y.o. female who presents to clinic today after being started on pink unna boot and Coban 2 wraps of the bilateral lower extremities with twice weekly wrap changes by home health.  The patient states that she is tolerated these wraps well and they have helped reduce the swelling, tightness and discomfort in her lower extremities.  She is also lost about 10 lb since we have seen her last month and admits to increased urination since wearing these wraps.  The sarcoid lesions of her lower extremities also look better.  No evidence of cellulitis, weeping or ulceration.    Clinical summary:  Patient initially seen on 04/18/2023 by referral from Dr. Albertina Silvestre for evaluation of bilateral lower extremity edema, ulcerations and discomfort.  Symptoms are persistent/worsening and began approximately 2-3 years ago.  Location is bilateral lower extremities below the knees. Symptoms are worse at the end of the day.  History of venous interventions includes none.  Denies family history of venous disease.  Patient also denies DVT or cellulitis.  She also has a history of sarcoidosis with skin lesions of the upper and lower extremities.    Bilateral complete venous reflux study 04/18/2023, shows no evidence of DVT bilaterally.  The study also shows proximal reflux in the right great saphenous vein.  No other superficial reflux noted.    Venous Disease Medical Necessity Documentation Initial Visit Date: 4/18/2023 Return Check Date:    Have you ever had a rupture or bleed from a varicose vein in your leg(s)?              [] Yes  [x] No   [] Yes   [] No   Have you ever been diagnosed with phlebitis, cellulitis, or inflammation in the area of the varicose veins of  your leg(s)?  [] Yes  [x] No     [] Yes   [] No   Do you have darkened or inflamed skin on your legs?   [x] Yes   [] No   [] Yes   [] No   Do you have leg swelling?     [x] Yes   [] No   [] Yes   [] No   Do you have leg pain? [x]   Yes   [] No   [] Yes   [] No   If yes, describe the type of pain?    []   Stabbing []  Radiating [x]  Aching   [x]  Tightness []  Throbbing               [x]  Burning []  Cramping              Do you have leg discomfort?   [x] Yes   [] No   [] Yes   [] No   If yes, describe the type of discomfort?    [x]  Heaviness [x]  Fullness   []  Restlessness [x] Tired/Fatigued [] Itching              Have you ever worn compression hose?   [x] Yes   [] No   [] Yes   [] No   If yes, how long? 1YR          Do you elevate your legs while sitting?   [x] Yes   [] No   [] Yes   [] No   Does venous disease (varicose veins, ulcers, skin changes, leg pain/swelling) interfere with your daily life?  If yes, check activities you are limited or unable to do.    [x]  Shower  [x]   Walk  []  Exercise  [] Play with children/grandchildren  []  Shop [] Work [x] Stand for any period of time [x] Sleep                               [x] Sitting for an extended period of time.           [x] Yes   [] No   [] Yes   [] No   Do you exercise/have you tried to exercise (i.e.  Walk our participate in a regular exercise routine)?  [] Yes  [x] No   [] Yes   [] No   BMI 46.5             Past Medical History:   Diagnosis Date    Collapse, lung     Diabetes mellitus, type 2     with Gastroparesis    Edema     Gastroparesis     Low back pain     Sarcoidosis     Lung and Cutaneous Sarcoid    Thyroid disease     Hypothyroid        Past Surgical History:   Procedure Laterality Date    CHEST TUBE INSERTION         Social History     Tobacco Use   Smoking Status Never   Smokeless Tobacco Never         Current Outpatient Medications:     albuterol (VENTOLIN HFA) 90 mcg/actuation inhaler, Inhale 2 puffs into the lungs every 6 (six) hours as needed for Wheezing. Rescue,  Disp: 18 g, Rfl: 5    amitriptyline (ELAVIL) 25 MG tablet, Take 25 mg by mouth every evening., Disp: , Rfl:     budesonide-formoterol 160-4.5 mcg (SYMBICORT) 160-4.5 mcg/actuation HFAA, Inhale 2 puffs into the lungs every 12 (twelve) hours. Controller, Disp: 10.2 g, Rfl: 5    chlorhexidine (PERIDEX) 0.12 % solution, TAKE AS DIRECTED, Disp: , Rfl:     furosemide (LASIX) 20 MG tablet, Take 20 mg by mouth daily as needed., Disp: , Rfl:     hydrOXYchloroQUINE (PLAQUENIL) 200 mg tablet, TAKE 1 TABLET(200 MG) BY MOUTH EVERY DAY, Disp: 30 tablet, Rfl: 2    insulin lispro 100 unit/mL injection, Inject into the skin., Disp: , Rfl:     levothyroxine (SYNTHROID) 25 MCG tablet, Take 25 mcg by mouth before breakfast., Disp: , Rfl:     metoclopramide HCl 5 mg TbDL, Take 10 mg by mouth 4 (four) times daily., Disp: , Rfl:     nitrofurantoin, macrocrystal-monohydrate, (MACROBID) 100 MG capsule, Take 100 mg by mouth 2 (two) times daily., Disp: , Rfl:     nystatin (MYCOSTATIN) powder, Apply topically 2 (two) times daily., Disp: , Rfl:     potassium chloride SA (K-DUR,KLOR-CON) 20 MEQ tablet, Take 20 mEq by mouth 2 (two) times daily as needed., Disp: , Rfl:     predniSONE (DELTASONE) 5 MG tablet, Take 2 tablets (10 mg total) by mouth every other day. Tapering dose: ordered 6/14/21: 4>3>2>1:   1 week each dose, Disp: 30 tablet, Rfl: 5    predniSONE (DELTASONE) 5 MG tablet, Take 10 mg by mouth every other day., Disp: , Rfl:     triamcinolone acetonide 0.025% (KENALOG) 0.025 % Oint, Apply topically 2 (two) times daily. For the face, Disp: 454 g, Rfl: 5    triamcinolone acetonide 0.1% (KENALOG) 0.1 % ointment, Apply topically 2 (two) times daily. For the body, Disp: 454 g, Rfl: 5    Review of Systems   Constitutional:  Negative for activity change, chills, diaphoresis, fatigue and fever.   Respiratory:  Negative for cough and shortness of breath.    Cardiovascular:  Positive for leg swelling. Negative for chest pain and claudication.         Hyperpigmentation LE   Gastrointestinal:  Negative for nausea and vomiting.   Musculoskeletal:  Positive for leg pain. Negative for joint swelling.   Integumentary:  Negative for rash and wound.   Neurological:  Negative for weakness and numbness.        II. PHYSICAL EXAM     Physical Exam  Constitutional:       General: She is awake. She is not in acute distress.     Appearance: Normal appearance. She is obese. She is not ill-appearing or toxic-appearing.   HENT:      Head: Normocephalic and atraumatic.   Eyes:      Extraocular Movements: Extraocular movements intact.      Conjunctiva/sclera: Conjunctivae normal.      Pupils: Pupils are equal, round, and reactive to light.   Neck:      Vascular: No carotid bruit or JVD.   Cardiovascular:      Rate and Rhythm: Normal rate and regular rhythm.      Pulses:           Dorsalis pedis pulses are detected w/ Doppler on the right side and detected w/ Doppler on the left side.        Posterior tibial pulses are detected w/ Doppler on the right side and detected w/ Doppler on the left side.      Heart sounds: No murmur heard.  Pulmonary:      Effort: Pulmonary effort is normal. No respiratory distress.      Breath sounds: No stridor. No wheezing, rhonchi or rales.   Musculoskeletal:         General: No swelling, tenderness or deformity.      Right lower le+ Pitting Edema present.      Left lower le+ Pitting Edema present.      Comments: Edema with scattered superficial skin lesions bilaterally.  No evidence of active cellulitis or weeping.   Feet:      Comments: Triphasic hand-held dopplerable pulses of the bilateral dorsalis pedis arteries.  Biphasic hand-held dopplerable pulses of the bilateral posterior tibial arteries.  Skin:     General: Skin is warm.      Capillary Refill: Capillary refill takes less than 2 seconds.      Coloration: Skin is not ashen.      Findings: No bruising, erythema, lesion, rash or wound.   Neurological:      Mental Status: She is alert  and oriented to person, place, and time.      Motor: No weakness.   Psychiatric:         Speech: Speech normal.         Behavior: Behavior normal. Behavior is cooperative.       Reticular/Spider veins noted:  RLE: none  LLE: none    Varicose veins noted:  RLE: none  LLE:  none    CEAP Classification  Clinical Signs: Class 4 - Skin changes ascribed to venous disease  Etiologic Classification: Primary  Anatomic distribution: Superficial  Pathophysiologic dysfunction: Reflux                       Venous Clinical Severity Score  Pain:2=Daily, moderate activity limitation, occasional analgesics  Varicose Veins: 0=None  Venous Edema: 3=Morning edema above ankle and requiring activity change, elevation  Pigmentation: 1=Diffuse, but limited in area and old (brown)  Inflammation: 0=None  Induration: 1=Focal, circummalleolar (< 5 cm)  Number of Active Ulcers: 0=0  Active Ulceration, Duration: 0=None  Active Ulcer Size: 0=None  Compressive Therapy: 0=Not used or not compliant  Total Score: 7     III. ASSESSMENT & PLAN (MEDICAL DECISION MAKING)     1. Hyperpigmentation of skin    2. Edema, lower extremity    3. Leg pain, bilateral    4. Venous insufficiency        Assessment/Diagnosis and Plan:  Previous Ultrasound of lower extremities reveals positive evidence of venous insufficiency in the right great saphenous vein.  Plan for conservative medical treatment at this time. The patient may benefit from endovenous ablation in the future.     - continue pink unna boot and Coban 2 wraps of the bilateral extremities.  Continue home health orders for twice weekly wrap changes to this effect.  - Therapeutic leg elevation  - Calf pumping exercises  - hope to transfer patient into compression stockings within the next couple of months.  - RTC 2 months for further evaluation        Hilario Lizama DO

## 2023-05-24 ENCOUNTER — EXTERNAL HOME HEALTH (OUTPATIENT)
Dept: HOME HEALTH SERVICES | Facility: HOSPITAL | Age: 53
End: 2023-05-24
Payer: COMMERCIAL

## 2023-06-26 ENCOUNTER — DOCUMENT SCAN (OUTPATIENT)
Dept: HOME HEALTH SERVICES | Facility: HOSPITAL | Age: 53
End: 2023-06-26
Payer: COMMERCIAL

## 2023-07-07 ENCOUNTER — EXTERNAL HOME HEALTH (OUTPATIENT)
Dept: HOME HEALTH SERVICES | Facility: HOSPITAL | Age: 53
End: 2023-07-07
Payer: COMMERCIAL

## 2023-07-18 ENCOUNTER — OFFICE VISIT (OUTPATIENT)
Dept: VASCULAR SURGERY | Facility: CLINIC | Age: 53
End: 2023-07-18
Payer: COMMERCIAL

## 2023-07-18 VITALS
HEART RATE: 72 BPM | BODY MASS INDEX: 47.09 KG/M2 | SYSTOLIC BLOOD PRESSURE: 114 MMHG | RESPIRATION RATE: 16 BRPM | WEIGHT: 275.81 LBS | HEIGHT: 64 IN | DIASTOLIC BLOOD PRESSURE: 60 MMHG

## 2023-07-18 DIAGNOSIS — L81.9 HYPERPIGMENTATION OF SKIN: Primary | ICD-10-CM

## 2023-07-18 DIAGNOSIS — I87.2 VENOUS INSUFFICIENCY: ICD-10-CM

## 2023-07-18 DIAGNOSIS — M79.604 LEG PAIN, BILATERAL: ICD-10-CM

## 2023-07-18 DIAGNOSIS — M79.605 LEG PAIN, BILATERAL: ICD-10-CM

## 2023-07-18 DIAGNOSIS — R60.0 EDEMA, LOWER EXTREMITY: ICD-10-CM

## 2023-07-18 PROCEDURE — 99214 OFFICE O/P EST MOD 30 MIN: CPT | Mod: ,,, | Performed by: FAMILY MEDICINE

## 2023-07-18 PROCEDURE — 3008F PR BODY MASS INDEX (BMI) DOCUMENTED: ICD-10-PCS | Mod: CPTII,,, | Performed by: FAMILY MEDICINE

## 2023-07-18 PROCEDURE — 99214 PR OFFICE/OUTPT VISIT, EST, LEVL IV, 30-39 MIN: ICD-10-PCS | Mod: ,,, | Performed by: FAMILY MEDICINE

## 2023-07-18 PROCEDURE — 3078F DIAST BP <80 MM HG: CPT | Mod: CPTII,,, | Performed by: FAMILY MEDICINE

## 2023-07-18 PROCEDURE — 3074F SYST BP LT 130 MM HG: CPT | Mod: CPTII,,, | Performed by: FAMILY MEDICINE

## 2023-07-18 PROCEDURE — 3074F PR MOST RECENT SYSTOLIC BLOOD PRESSURE < 130 MM HG: ICD-10-PCS | Mod: CPTII,,, | Performed by: FAMILY MEDICINE

## 2023-07-18 PROCEDURE — 1160F PR REVIEW ALL MEDS BY PRESCRIBER/CLIN PHARMACIST DOCUMENTED: ICD-10-PCS | Mod: CPTII,,, | Performed by: FAMILY MEDICINE

## 2023-07-18 PROCEDURE — 3078F PR MOST RECENT DIASTOLIC BLOOD PRESSURE < 80 MM HG: ICD-10-PCS | Mod: CPTII,,, | Performed by: FAMILY MEDICINE

## 2023-07-18 PROCEDURE — 1159F PR MEDICATION LIST DOCUMENTED IN MEDICAL RECORD: ICD-10-PCS | Mod: CPTII,,, | Performed by: FAMILY MEDICINE

## 2023-07-18 PROCEDURE — 1160F RVW MEDS BY RX/DR IN RCRD: CPT | Mod: CPTII,,, | Performed by: FAMILY MEDICINE

## 2023-07-18 PROCEDURE — 3008F BODY MASS INDEX DOCD: CPT | Mod: CPTII,,, | Performed by: FAMILY MEDICINE

## 2023-07-18 PROCEDURE — 1159F MED LIST DOCD IN RCRD: CPT | Mod: CPTII,,, | Performed by: FAMILY MEDICINE

## 2023-07-18 NOTE — PROGRESS NOTES
[facility] CLINIC NOTE    Patient ID: Zahra Gandara is a 52 y.o. female.    I. HISTORY     Chief Complaint:   Chief Complaint   Patient presents with    Follow-up     Exam room 3.  2 months wrap check, pink unna boot and coban 2 lite.        HPI: Zahra Gandara is a 52 y.o. female who presents to clinic today after another 2 months of pink Coban and Coban 2 wraps with twice weekly changes by home health.  This makes 3 months total therapy.  She states that home health stop coming out late last week because she is out of home health visits.  She continues to tolerate compression well.  Her skin condition and swelling are much improved.  She feels as though she continues to lose water weight.  She has sarcoid lesions noted on upper and lower extremities.  Lesions of the lower extremities are improved, are closed without open ulceration or weeping.    Clinical summary:  Patient initially seen on 04/18/2023 by referral from Dr. Albertina Silvestre for evaluation of bilateral lower extremity edema, ulcerations and discomfort.  Symptoms are persistent/worsening and began approximately 2-3 years ago.  Location is bilateral lower extremities below the knees. Symptoms are worse at the end of the day.  History of venous interventions includes none.  Denies family history of venous disease.  Patient also denies DVT or cellulitis.  She also has a history of sarcoidosis with skin lesions of the upper and lower extremities.    Bilateral complete venous reflux study 04/18/2023, shows no evidence of DVT bilaterally.  The study also shows proximal reflux in the right great saphenous vein.  No other superficial reflux noted.    Venous Disease Medical Necessity Documentation Initial Visit Date: 4/18/2023 Return Check Date:    Have you ever had a rupture or bleed from a varicose vein in your leg(s)?              [] Yes  [x] No   [] Yes   [] No   Have you ever been diagnosed with phlebitis, cellulitis, or inflammation in the area of the  varicose veins of  your leg(s)?  [] Yes  [x] No    [] Yes   [] No   Do you have darkened or inflamed skin on your legs?   [x] Yes   [] No   [] Yes   [] No   Do you have leg swelling?     [x] Yes   [] No   [] Yes   [] No   Do you have leg pain? [x]   Yes   [] No   [] Yes   [] No   If yes, describe the type of pain?    []   Stabbing []  Radiating [x]  Aching   [x]  Tightness []  Throbbing               [x]  Burning []  Cramping              Do you have leg discomfort?   [x] Yes   [] No   [] Yes   [] No   If yes, describe the type of discomfort?    [x]  Heaviness [x]  Fullness   []  Restlessness [x] Tired/Fatigued [] Itching              Have you ever worn compression hose?   [x] Yes   [] No   [] Yes   [] No   If yes, how long? 1YR          Do you elevate your legs while sitting?   [x] Yes   [] No   [] Yes   [] No   Does venous disease (varicose veins, ulcers, skin changes, leg pain/swelling) interfere with your daily life?  If yes, check activities you are limited or unable to do.    [x]  Shower  [x]   Walk  []  Exercise  [] Play with children/grandchildren  []  Shop [] Work [x] Stand for any period of time [x] Sleep                               [x] Sitting for an extended period of time.           [x] Yes   [] No   [] Yes   [] No   Do you exercise/have you tried to exercise (i.e.  Walk our participate in a regular exercise routine)?  [] Yes  [x] No   [] Yes   [] No   BMI 46.5             Past Medical History:   Diagnosis Date    Collapse, lung     Diabetes mellitus, type 2     with Gastroparesis    Edema     Gastroparesis     Low back pain     Sarcoidosis     Lung and Cutaneous Sarcoid    Thyroid disease     Hypothyroid        Past Surgical History:   Procedure Laterality Date    CHEST TUBE INSERTION         Social History     Tobacco Use   Smoking Status Never   Smokeless Tobacco Never         Current Outpatient Medications:     amitriptyline (ELAVIL) 25 MG tablet, Take 25 mg by mouth every evening., Disp: , Rfl:      chlorhexidine (PERIDEX) 0.12 % solution, TAKE AS DIRECTED, Disp: , Rfl:     furosemide (LASIX) 20 MG tablet, Take 20 mg by mouth daily as needed., Disp: , Rfl:     hydrOXYchloroQUINE (PLAQUENIL) 200 mg tablet, TAKE 1 TABLET(200 MG) BY MOUTH EVERY DAY, Disp: 30 tablet, Rfl: 2    insulin lispro 100 unit/mL injection, Inject into the skin., Disp: , Rfl:     metoclopramide HCl 5 mg TbDL, Take 10 mg by mouth 4 (four) times daily., Disp: , Rfl:     nitrofurantoin, macrocrystal-monohydrate, (MACROBID) 100 MG capsule, Take 100 mg by mouth 2 (two) times daily., Disp: , Rfl:     potassium chloride SA (K-DUR,KLOR-CON) 20 MEQ tablet, Take 20 mEq by mouth 2 (two) times daily as needed., Disp: , Rfl:     predniSONE (DELTASONE) 5 MG tablet, Take 2 tablets (10 mg total) by mouth every other day. Tapering dose: ordered 6/14/21: 4>3>2>1:   1 week each dose, Disp: 30 tablet, Rfl: 5    predniSONE (DELTASONE) 5 MG tablet, Take 10 mg by mouth every other day., Disp: , Rfl:     triamcinolone acetonide 0.025% (KENALOG) 0.025 % Oint, Apply topically 2 (two) times daily. For the face, Disp: 454 g, Rfl: 5    triamcinolone acetonide 0.1% (KENALOG) 0.1 % ointment, Apply topically 2 (two) times daily. For the body, Disp: 454 g, Rfl: 5    albuterol (VENTOLIN HFA) 90 mcg/actuation inhaler, Inhale 2 puffs into the lungs every 6 (six) hours as needed for Wheezing. Rescue (Patient not taking: Reported on 7/18/2023), Disp: 18 g, Rfl: 5    budesonide-formoterol 160-4.5 mcg (SYMBICORT) 160-4.5 mcg/actuation HFAA, Inhale 2 puffs into the lungs every 12 (twelve) hours. Controller, Disp: 10.2 g, Rfl: 5    levothyroxine (SYNTHROID) 25 MCG tablet, Take 25 mcg by mouth before breakfast., Disp: , Rfl:     nystatin (MYCOSTATIN) powder, Apply topically 2 (two) times daily., Disp: , Rfl:     Review of Systems   Constitutional:  Negative for activity change, chills, diaphoresis, fatigue and fever.   Respiratory:  Negative for cough and shortness of breath.     Cardiovascular:  Positive for leg swelling. Negative for chest pain and claudication.        Hyperpigmentation LE   Gastrointestinal:  Negative for nausea and vomiting.   Musculoskeletal:  Positive for leg pain. Negative for joint swelling.   Integumentary:  Negative for rash and wound.   Neurological:  Negative for weakness and numbness.        II. PHYSICAL EXAM     Physical Exam  Constitutional:       General: She is awake. She is not in acute distress.     Appearance: Normal appearance. She is obese. She is not ill-appearing or toxic-appearing.   HENT:      Head: Normocephalic and atraumatic.   Eyes:      Extraocular Movements: Extraocular movements intact.      Conjunctiva/sclera: Conjunctivae normal.      Pupils: Pupils are equal, round, and reactive to light.   Neck:      Vascular: No carotid bruit or JVD.   Cardiovascular:      Rate and Rhythm: Normal rate and regular rhythm.      Pulses:           Dorsalis pedis pulses are detected w/ Doppler on the right side and detected w/ Doppler on the left side.        Posterior tibial pulses are detected w/ Doppler on the right side and detected w/ Doppler on the left side.      Heart sounds: No murmur heard.  Pulmonary:      Effort: Pulmonary effort is normal. No respiratory distress.      Breath sounds: No stridor. No wheezing, rhonchi or rales.   Musculoskeletal:         General: No swelling, tenderness or deformity.      Right lower le+ Pitting Edema present.      Left lower le+ Pitting Edema present.      Comments: Edema with scattered superficial skin lesions bilaterally.  No evidence of active cellulitis or weeping.   Feet:      Comments: Triphasic hand-held dopplerable pulses of the bilateral dorsalis pedis arteries.  Biphasic hand-held dopplerable pulses of the bilateral posterior tibial arteries.  Skin:     General: Skin is warm.      Capillary Refill: Capillary refill takes less than 2 seconds.      Coloration: Skin is not ashen.      Findings: No  bruising, erythema, lesion, rash or wound.   Neurological:      Mental Status: She is alert and oriented to person, place, and time.      Motor: No weakness.   Psychiatric:         Speech: Speech normal.         Behavior: Behavior normal. Behavior is cooperative.       Reticular/Spider veins noted:  RLE: none  LLE: none    Varicose veins noted:  RLE: none  LLE:  none    CEAP Classification  Clinical Signs: Class 4 - Skin changes ascribed to venous disease  Etiologic Classification: Primary  Anatomic distribution: Superficial  Pathophysiologic dysfunction: Reflux                       Venous Clinical Severity Score  Pain:2=Daily, moderate activity limitation, occasional analgesics  Varicose Veins: 0=None  Venous Edema: 3=Morning edema above ankle and requiring activity change, elevation  Pigmentation: 1=Diffuse, but limited in area and old (brown)  Inflammation: 0=None  Induration: 1=Focal, circummalleolar (< 5 cm)  Number of Active Ulcers: 0=0  Active Ulceration, Duration: 0=None  Active Ulcer Size: 0=None  Compressive Therapy: 0=Not used or not compliant  Total Score: 7     III. ASSESSMENT & PLAN (MEDICAL DECISION MAKING)     1. Hyperpigmentation of skin    2. Edema, lower extremity    3. Leg pain, bilateral    4. Venous insufficiency      Assessment/Diagnosis and Plan:  Previous Ultrasound of lower extremities reveals positive evidence of venous insufficiency in the right great saphenous vein.  Plan for conservative medical treatment at this time. The patient may benefit from endovenous ablation in the future.     -discontinue pink unna boot and Coban 2 wraps.  With wrap changes by home health.  - start the patient on 20-30 mm Hg compression stockings bilaterally.  - Therapeutic leg elevation  - Calf pumping exercises  - RTC 1 month for further evaluation      Hilario Lizama DO

## 2023-08-15 ENCOUNTER — OFFICE VISIT (OUTPATIENT)
Dept: VASCULAR SURGERY | Facility: CLINIC | Age: 53
End: 2023-08-15
Payer: COMMERCIAL

## 2023-08-15 ENCOUNTER — HOSPITAL ENCOUNTER (OUTPATIENT)
Dept: RADIOLOGY | Facility: HOSPITAL | Age: 53
Discharge: HOME | End: 2023-08-15
Attending: FAMILY MEDICINE
Payer: COMMERCIAL

## 2023-08-15 VITALS
WEIGHT: 278.19 LBS | RESPIRATION RATE: 16 BRPM | HEIGHT: 64 IN | DIASTOLIC BLOOD PRESSURE: 68 MMHG | SYSTOLIC BLOOD PRESSURE: 107 MMHG | HEART RATE: 76 BPM | BODY MASS INDEX: 47.49 KG/M2

## 2023-08-15 DIAGNOSIS — D86.9 SARCOID: ICD-10-CM

## 2023-08-15 DIAGNOSIS — D86.9 SARCOID: Primary | ICD-10-CM

## 2023-08-15 DIAGNOSIS — R06.00 DYSPNEA, UNSPECIFIED TYPE: ICD-10-CM

## 2023-08-15 DIAGNOSIS — R60.0 EDEMA, LOWER EXTREMITY: ICD-10-CM

## 2023-08-15 LAB
ALBUMIN SERPL BCP-MCNC: 3.5 G/DL (ref 3.5–5)
ALBUMIN/GLOB SERPL: 0.7 {RATIO}
ALP SERPL-CCNC: 78 U/L (ref 41–108)
ALT SERPL W P-5'-P-CCNC: 13 U/L (ref 13–56)
ANION GAP SERPL CALCULATED.3IONS-SCNC: 11 MMOL/L (ref 7–16)
ANISOCYTOSIS BLD QL SMEAR: NORMAL
AST SERPL W P-5'-P-CCNC: 20 U/L (ref 15–37)
BASOPHILS # BLD AUTO: 0.1 K/UL (ref 0–0.2)
BASOPHILS NFR BLD AUTO: 1.5 % (ref 0–1)
BILIRUB SERPL-MCNC: 0.5 MG/DL (ref ?–1.2)
BUN SERPL-MCNC: 16 MG/DL (ref 7–18)
BUN/CREAT SERPL: 8 (ref 6–20)
CALCIUM SERPL-MCNC: 10.3 MG/DL (ref 8.5–10.1)
CHLORIDE SERPL-SCNC: 107 MMOL/L (ref 98–107)
CO2 SERPL-SCNC: 24 MMOL/L (ref 21–32)
CREAT SERPL-MCNC: 2.05 MG/DL (ref 0.55–1.02)
DIFFERENTIAL METHOD BLD: ABNORMAL
EGFR (NO RACE VARIABLE) (RUSH/TITUS): 29 ML/MIN/1.73M2
EOSINOPHIL # BLD AUTO: 0.06 K/UL (ref 0–0.5)
EOSINOPHIL NFR BLD AUTO: 0.9 % (ref 1–4)
ERYTHROCYTE [DISTWIDTH] IN BLOOD BY AUTOMATED COUNT: 17.3 % (ref 11.5–14.5)
GLOBULIN SER-MCNC: 5 G/DL (ref 2–4)
GLUCOSE SERPL-MCNC: 99 MG/DL (ref 74–106)
HCT VFR BLD AUTO: 38.6 % (ref 38–47)
HGB BLD-MCNC: 12.1 G/DL (ref 12–16)
HYPOCHROMIA BLD QL SMEAR: NORMAL
IMM GRANULOCYTES # BLD AUTO: 0.05 K/UL (ref 0–0.04)
IMM GRANULOCYTES NFR BLD: 0.8 % (ref 0–0.4)
LYMPHOCYTES # BLD AUTO: 1.12 K/UL (ref 1–4.8)
LYMPHOCYTES NFR BLD AUTO: 17 % (ref 27–41)
MCH RBC QN AUTO: 21.6 PG (ref 27–31)
MCHC RBC AUTO-ENTMCNC: 31.3 G/DL (ref 32–36)
MCV RBC AUTO: 69.1 FL (ref 80–96)
MICROCYTES BLD QL SMEAR: NORMAL
MONOCYTES # BLD AUTO: 0.21 K/UL (ref 0–0.8)
MONOCYTES NFR BLD AUTO: 3.2 % (ref 2–6)
MPC BLD CALC-MCNC: 9.5 FL (ref 9.4–12.4)
NEUTROPHILS # BLD AUTO: 5.05 K/UL (ref 1.8–7.7)
NEUTROPHILS NFR BLD AUTO: 76.6 % (ref 53–65)
NRBC # BLD AUTO: 0 X10E3/UL
NRBC, AUTO (.00): 0 %
NT-PROBNP SERPL-MCNC: 47 PG/ML (ref 1–125)
PLATELET # BLD AUTO: 313 K/UL (ref 150–400)
PLATELET MORPHOLOGY: NORMAL
POTASSIUM SERPL-SCNC: 4.3 MMOL/L (ref 3.5–5.1)
PROT SERPL-MCNC: 8.5 G/DL (ref 6.4–8.2)
RBC # BLD AUTO: 5.59 M/UL (ref 4.2–5.4)
SCHISTOCYTES BLD QL AUTO: NORMAL
SODIUM SERPL-SCNC: 138 MMOL/L (ref 136–145)
WBC # BLD AUTO: 6.59 K/UL (ref 4.5–11)

## 2023-08-15 PROCEDURE — 1160F RVW MEDS BY RX/DR IN RCRD: CPT | Mod: CPTII,,, | Performed by: FAMILY MEDICINE

## 2023-08-15 PROCEDURE — 85025 COMPLETE CBC W/AUTO DIFF WBC: CPT | Mod: ,,, | Performed by: CLINICAL MEDICAL LABORATORY

## 2023-08-15 PROCEDURE — 1160F PR REVIEW ALL MEDS BY PRESCRIBER/CLIN PHARMACIST DOCUMENTED: ICD-10-PCS | Mod: CPTII,,, | Performed by: FAMILY MEDICINE

## 2023-08-15 PROCEDURE — 3008F BODY MASS INDEX DOCD: CPT | Mod: CPTII,,, | Performed by: FAMILY MEDICINE

## 2023-08-15 PROCEDURE — 3078F PR MOST RECENT DIASTOLIC BLOOD PRESSURE < 80 MM HG: ICD-10-PCS | Mod: CPTII,,, | Performed by: FAMILY MEDICINE

## 2023-08-15 PROCEDURE — 99214 PR OFFICE/OUTPT VISIT, EST, LEVL IV, 30-39 MIN: ICD-10-PCS | Mod: ,,, | Performed by: FAMILY MEDICINE

## 2023-08-15 PROCEDURE — 3008F PR BODY MASS INDEX (BMI) DOCUMENTED: ICD-10-PCS | Mod: CPTII,,, | Performed by: FAMILY MEDICINE

## 2023-08-15 PROCEDURE — 83880 ASSAY OF NATRIURETIC PEPTIDE: CPT | Mod: ,,, | Performed by: CLINICAL MEDICAL LABORATORY

## 2023-08-15 PROCEDURE — 99214 OFFICE O/P EST MOD 30 MIN: CPT | Mod: ,,, | Performed by: FAMILY MEDICINE

## 2023-08-15 PROCEDURE — 83880 NT-PRO NATRIURETIC PEPTIDE: ICD-10-PCS | Mod: ,,, | Performed by: CLINICAL MEDICAL LABORATORY

## 2023-08-15 PROCEDURE — 3074F PR MOST RECENT SYSTOLIC BLOOD PRESSURE < 130 MM HG: ICD-10-PCS | Mod: CPTII,,, | Performed by: FAMILY MEDICINE

## 2023-08-15 PROCEDURE — 71046 X-RAY EXAM CHEST 2 VIEWS: CPT | Mod: TC,PN

## 2023-08-15 PROCEDURE — 80053 COMPREHENSIVE METABOLIC PANEL: ICD-10-PCS | Mod: ,,, | Performed by: CLINICAL MEDICAL LABORATORY

## 2023-08-15 PROCEDURE — 1159F MED LIST DOCD IN RCRD: CPT | Mod: CPTII,,, | Performed by: FAMILY MEDICINE

## 2023-08-15 PROCEDURE — 3074F SYST BP LT 130 MM HG: CPT | Mod: CPTII,,, | Performed by: FAMILY MEDICINE

## 2023-08-15 PROCEDURE — 1159F PR MEDICATION LIST DOCUMENTED IN MEDICAL RECORD: ICD-10-PCS | Mod: CPTII,,, | Performed by: FAMILY MEDICINE

## 2023-08-15 PROCEDURE — 3078F DIAST BP <80 MM HG: CPT | Mod: CPTII,,, | Performed by: FAMILY MEDICINE

## 2023-08-15 PROCEDURE — 80053 COMPREHEN METABOLIC PANEL: CPT | Mod: ,,, | Performed by: CLINICAL MEDICAL LABORATORY

## 2023-08-15 PROCEDURE — 85025 CBC WITH DIFFERENTIAL: ICD-10-PCS | Mod: ,,, | Performed by: CLINICAL MEDICAL LABORATORY

## 2023-08-15 RX ORDER — POTASSIUM CHLORIDE 1500 MG/1
1 TABLET, EXTENDED RELEASE ORAL 2 TIMES DAILY
COMMUNITY
Start: 2023-06-16 | End: 2024-02-06

## 2023-08-15 NOTE — PROGRESS NOTES
[facility] CLINIC NOTE    Patient ID: Zahra Gandara is a 52 y.o. female.    I. HISTORY     Chief Complaint:   Chief Complaint   Patient presents with    Follow-up     1M FOLLOW UP        HPI: Zahra Gandara is a 52 y.o. female who presents to clinic today For a one-month follow-up after discontinuing pink unna boot and Coban 2 compression wraps.  On last visit, she was started on 20-30 mm Hg compression stockings for daily wear.  She states that she continues to have bilateral lower extremity edema and hyperpigmentation.  A lot of her hyperpigmentation is secondary to her sarcoid lesions better found on her upper and lower extremities.  She has known venous reflux in her right great saphenous vein.  However, her symptoms continued to be pretty equal bilaterally and uncontrolled by compression stockings.  Lesions of the lower extremities are improved, are closed without open ulceration or weeping.    Clinical summary:  Patient initially seen on 04/18/2023 by referral from Dr. Albertina Silvestre for evaluation of bilateral lower extremity edema, ulcerations and discomfort.  Symptoms are persistent/worsening and began approximately 2-3 years ago.  Location is bilateral lower extremities below the knees. Symptoms are worse at the end of the day.  History of venous interventions includes none.  Denies family history of venous disease.  Patient also denies DVT or cellulitis.  She also has a history of sarcoidosis with skin lesions of the upper and lower extremities.    Bilateral complete venous reflux study 04/18/2023, shows no evidence of DVT bilaterally.  The study also shows proximal reflux in the right great saphenous vein.  No other superficial reflux noted.    Venous Disease Medical Necessity Documentation Initial Visit Date: 4/18/2023 Return Check Date:    Have you ever had a rupture or bleed from a varicose vein in your leg(s)?              [] Yes  [x] No   [] Yes   [] No   Have you ever been diagnosed with  phlebitis, cellulitis, or inflammation in the area of the varicose veins of  your leg(s)?  [] Yes  [x] No    [] Yes   [] No   Do you have darkened or inflamed skin on your legs?   [x] Yes   [] No   [] Yes   [] No   Do you have leg swelling?     [x] Yes   [] No   [] Yes   [] No   Do you have leg pain? [x]   Yes   [] No   [] Yes   [] No   If yes, describe the type of pain?    []   Stabbing []  Radiating [x]  Aching   [x]  Tightness []  Throbbing               [x]  Burning []  Cramping              Do you have leg discomfort?   [x] Yes   [] No   [] Yes   [] No   If yes, describe the type of discomfort?    [x]  Heaviness [x]  Fullness   []  Restlessness [x] Tired/Fatigued [] Itching              Have you ever worn compression hose?   [x] Yes   [] No   [] Yes   [] No   If yes, how long? 1YR          Do you elevate your legs while sitting?   [x] Yes   [] No   [] Yes   [] No   Does venous disease (varicose veins, ulcers, skin changes, leg pain/swelling) interfere with your daily life?  If yes, check activities you are limited or unable to do.    [x]  Shower  [x]   Walk  []  Exercise  [] Play with children/grandchildren  []  Shop [] Work [x] Stand for any period of time [x] Sleep                               [x] Sitting for an extended period of time.           [x] Yes   [] No   [] Yes   [] No   Do you exercise/have you tried to exercise (i.e.  Walk our participate in a regular exercise routine)?  [] Yes  [x] No   [] Yes   [] No   BMI 46.5             Past Medical History:   Diagnosis Date    Collapse, lung     Diabetes mellitus, type 2     with Gastroparesis    Edema     Gastroparesis     Low back pain     Sarcoidosis     Lung and Cutaneous Sarcoid    Thyroid disease     Hypothyroid        Past Surgical History:   Procedure Laterality Date    CHEST TUBE INSERTION         Social History     Tobacco Use   Smoking Status Never   Smokeless Tobacco Never         Current Outpatient Medications:     amitriptyline (ELAVIL) 25 MG  tablet, Take 25 mg by mouth every evening., Disp: , Rfl:     chlorhexidine (PERIDEX) 0.12 % solution, TAKE AS DIRECTED, Disp: , Rfl:     furosemide (LASIX) 20 MG tablet, Take 20 mg by mouth daily as needed., Disp: , Rfl:     hydrOXYchloroQUINE (PLAQUENIL) 200 mg tablet, TAKE 1 TABLET(200 MG) BY MOUTH EVERY DAY, Disp: 30 tablet, Rfl: 2    insulin lispro 100 unit/mL injection, Inject into the skin., Disp: , Rfl:     metoclopramide HCl 5 mg TbDL, Take 10 mg by mouth 4 (four) times daily., Disp: , Rfl:     nitrofurantoin, macrocrystal-monohydrate, (MACROBID) 100 MG capsule, Take 100 mg by mouth 2 (two) times daily., Disp: , Rfl:     potassium chloride (K-TAB) 20 mEq, Take 1 tablet by mouth 2 (two) times daily., Disp: , Rfl:     potassium chloride SA (K-DUR,KLOR-CON) 20 MEQ tablet, Take 20 mEq by mouth 2 (two) times daily as needed., Disp: , Rfl:     predniSONE (DELTASONE) 5 MG tablet, Take 2 tablets (10 mg total) by mouth every other day. Tapering dose: ordered 6/14/21: 4>3>2>1:   1 week each dose, Disp: 30 tablet, Rfl: 5    predniSONE (DELTASONE) 5 MG tablet, Take 10 mg by mouth every other day., Disp: , Rfl:     triamcinolone acetonide 0.025% (KENALOG) 0.025 % Oint, Apply topically 2 (two) times daily. For the face, Disp: 454 g, Rfl: 5    triamcinolone acetonide 0.1% (KENALOG) 0.1 % ointment, Apply topically 2 (two) times daily. For the body, Disp: 454 g, Rfl: 5    albuterol (VENTOLIN HFA) 90 mcg/actuation inhaler, Inhale 2 puffs into the lungs every 6 (six) hours as needed for Wheezing. Rescue (Patient not taking: Reported on 8/15/2023), Disp: 18 g, Rfl: 5    budesonide-formoterol 160-4.5 mcg (SYMBICORT) 160-4.5 mcg/actuation HFAA, Inhale 2 puffs into the lungs every 12 (twelve) hours. Controller, Disp: 10.2 g, Rfl: 5    levothyroxine (SYNTHROID) 25 MCG tablet, Take 25 mcg by mouth before breakfast., Disp: , Rfl:     nystatin (MYCOSTATIN) powder, Apply topically 2 (two) times daily., Disp: , Rfl:     Review of  Systems   Constitutional:  Negative for activity change, chills, diaphoresis, fatigue and fever.   Respiratory:  Negative for cough and shortness of breath.    Cardiovascular:  Positive for leg swelling. Negative for chest pain and claudication.        Hyperpigmentation LE   Gastrointestinal:  Negative for nausea and vomiting.   Musculoskeletal:  Positive for leg pain. Negative for joint swelling.   Integumentary:  Negative for rash and wound.   Neurological:  Negative for weakness and numbness.          II. PHYSICAL EXAM     Physical Exam  Constitutional:       General: She is awake. She is not in acute distress.     Appearance: Normal appearance. She is obese. She is not ill-appearing or toxic-appearing.   HENT:      Head: Normocephalic and atraumatic.   Eyes:      Extraocular Movements: Extraocular movements intact.      Conjunctiva/sclera: Conjunctivae normal.      Pupils: Pupils are equal, round, and reactive to light.   Neck:      Vascular: No carotid bruit or JVD.   Cardiovascular:      Rate and Rhythm: Normal rate and regular rhythm.      Pulses:           Dorsalis pedis pulses are detected w/ Doppler on the right side and detected w/ Doppler on the left side.        Posterior tibial pulses are detected w/ Doppler on the right side and detected w/ Doppler on the left side.      Heart sounds: No murmur heard.  Pulmonary:      Effort: Pulmonary effort is normal. No respiratory distress.      Breath sounds: No stridor. No wheezing, rhonchi or rales.   Musculoskeletal:         General: No swelling, tenderness or deformity.      Right lower le+ Pitting Edema present.      Left lower le+ Pitting Edema present.      Comments: Edema with scattered superficial skin lesions bilaterally.  No evidence of active cellulitis or weeping.   Feet:      Comments: Triphasic hand-held dopplerable pulses of the bilateral dorsalis pedis arteries.  Biphasic hand-held dopplerable pulses of the bilateral posterior tibial  arteries.  Skin:     General: Skin is warm.      Capillary Refill: Capillary refill takes less than 2 seconds.      Coloration: Skin is not ashen.      Findings: No bruising, erythema, lesion, rash or wound.   Neurological:      Mental Status: She is alert and oriented to person, place, and time.      Motor: No weakness.   Psychiatric:         Speech: Speech normal.         Behavior: Behavior normal. Behavior is cooperative.         Reticular/Spider veins noted:  RLE: none  LLE: none    Varicose veins noted:  RLE: none  LLE:  none    CEAP Classification  Clinical Signs: Class 4 - Skin changes ascribed to venous disease  Etiologic Classification: Primary  Anatomic distribution: Superficial  Pathophysiologic dysfunction: Reflux                         Venous Clinical Severity Score  Pain:2=Daily, moderate activity limitation, occasional analgesics  Varicose Veins: 0=None  Venous Edema: 3=Morning edema above ankle and requiring activity change, elevation  Pigmentation: 1=Diffuse, but limited in area and old (brown)  Inflammation: 0=None  Induration: 1=Focal, circummalleolar (< 5 cm)  Number of Active Ulcers: 0=0  Active Ulceration, Duration: 0=None  Active Ulcer Size: 0=None  Compressive Therapy: 3=Full compliance, stockings + elevation  Total Score: 10       III. ASSESSMENT & PLAN (MEDICAL DECISION MAKING)     1. Sarcoid    2. Edema, lower extremity    3. Dyspnea, unspecified type        Assessment/Diagnosis and Plan:  Previous Ultrasound of lower extremities reveals positive evidence of venous insufficiency in the right great saphenous vein.  Plan for conservative medical treatment at this time. The patient may benefit from endovenous ablation in the future.  Her symptoms continue to be equally bilateral.    - the patient has undergone 3 months of conservative therapy so far.  - continue 20-30 mm Hg compression stockings bilaterally.  - Therapeutic leg elevation  - Calf pumping exercises  - today we will order a  chest x-ray, BNP, CBC and CMP.  Call patient with results.  - consider lymphatic pump device to help control daily swelling.    Orders Placed This Encounter    X-Ray Chest PA And Lateral    CBC Auto Differential    Comprehensive Metabolic Panel    NT-Pro Natriuretic Peptide    CBC with Differential    CBC Morphology      Hilario Lizama, DO

## 2023-09-01 ENCOUNTER — TELEPHONE (OUTPATIENT)
Dept: VASCULAR SURGERY | Facility: CLINIC | Age: 53
End: 2023-09-01
Payer: COMMERCIAL

## 2023-09-19 ENCOUNTER — OFFICE VISIT (OUTPATIENT)
Dept: VASCULAR SURGERY | Facility: CLINIC | Age: 53
End: 2023-09-19
Payer: COMMERCIAL

## 2023-09-19 VITALS
BODY MASS INDEX: 47.46 KG/M2 | RESPIRATION RATE: 16 BRPM | HEART RATE: 72 BPM | DIASTOLIC BLOOD PRESSURE: 60 MMHG | WEIGHT: 278 LBS | HEIGHT: 64 IN | SYSTOLIC BLOOD PRESSURE: 114 MMHG

## 2023-09-19 DIAGNOSIS — D86.9 SARCOID: Primary | ICD-10-CM

## 2023-09-19 DIAGNOSIS — L81.9 HYPERPIGMENTATION OF SKIN: ICD-10-CM

## 2023-09-19 DIAGNOSIS — M79.604 LEG PAIN, BILATERAL: ICD-10-CM

## 2023-09-19 DIAGNOSIS — R60.0 EDEMA, LOWER EXTREMITY: ICD-10-CM

## 2023-09-19 DIAGNOSIS — I87.2 VENOUS INSUFFICIENCY: ICD-10-CM

## 2023-09-19 DIAGNOSIS — Q82.0 HEREDITARY LYMPHEDEMA: ICD-10-CM

## 2023-09-19 DIAGNOSIS — M79.605 LEG PAIN, BILATERAL: ICD-10-CM

## 2023-09-19 PROCEDURE — 3074F SYST BP LT 130 MM HG: CPT | Mod: CPTII,,, | Performed by: FAMILY MEDICINE

## 2023-09-19 PROCEDURE — 1160F RVW MEDS BY RX/DR IN RCRD: CPT | Mod: CPTII,,, | Performed by: FAMILY MEDICINE

## 2023-09-19 PROCEDURE — 3008F PR BODY MASS INDEX (BMI) DOCUMENTED: ICD-10-PCS | Mod: CPTII,,, | Performed by: FAMILY MEDICINE

## 2023-09-19 PROCEDURE — 3008F BODY MASS INDEX DOCD: CPT | Mod: CPTII,,, | Performed by: FAMILY MEDICINE

## 2023-09-19 PROCEDURE — 1159F PR MEDICATION LIST DOCUMENTED IN MEDICAL RECORD: ICD-10-PCS | Mod: CPTII,,, | Performed by: FAMILY MEDICINE

## 2023-09-19 PROCEDURE — 3078F DIAST BP <80 MM HG: CPT | Mod: CPTII,,, | Performed by: FAMILY MEDICINE

## 2023-09-19 PROCEDURE — 3078F PR MOST RECENT DIASTOLIC BLOOD PRESSURE < 80 MM HG: ICD-10-PCS | Mod: CPTII,,, | Performed by: FAMILY MEDICINE

## 2023-09-19 PROCEDURE — 1160F PR REVIEW ALL MEDS BY PRESCRIBER/CLIN PHARMACIST DOCUMENTED: ICD-10-PCS | Mod: CPTII,,, | Performed by: FAMILY MEDICINE

## 2023-09-19 PROCEDURE — 3074F PR MOST RECENT SYSTOLIC BLOOD PRESSURE < 130 MM HG: ICD-10-PCS | Mod: CPTII,,, | Performed by: FAMILY MEDICINE

## 2023-09-19 PROCEDURE — 99214 PR OFFICE/OUTPT VISIT, EST, LEVL IV, 30-39 MIN: ICD-10-PCS | Mod: ,,, | Performed by: FAMILY MEDICINE

## 2023-09-19 PROCEDURE — 99214 OFFICE O/P EST MOD 30 MIN: CPT | Mod: ,,, | Performed by: FAMILY MEDICINE

## 2023-09-19 PROCEDURE — 1159F MED LIST DOCD IN RCRD: CPT | Mod: CPTII,,, | Performed by: FAMILY MEDICINE

## 2023-09-19 NOTE — PROGRESS NOTES
[facility] CLINIC NOTE    Patient ID: Zahra Gandara is a 53 y.o. female.    I. HISTORY     Chief Complaint:   Chief Complaint   Patient presents with    Follow-up     Exam room 3.  1 Month venous check.        HPI: Zahra Gandara is a 53 y.o. female who presents to clinic today for a one-month follow-up and compression check.  On last visit we have ordered chest x-ray, BNP, CBC and a CMP the only abnormal finding was increasing creatinine for which she was referred back to her nephrologist.  She continues to wear 20-30 millimeter of mercury compression stockings daily along with daily leg exercise and leg elevation.  She continues to have pain, tightness heaviness of her bilateral lower extremities.  She considers these symptoms life altering.  She has known chronic venous insufficiency in the right lower extremity.  However her symptoms are equal lateral.  She has enlarged inguinal lymph nodes as well. Lesions of the lower extremities are improved, are closed without open ulceration or weeping.    Clinical summary:  Patient initially seen on 04/18/2023 by referral from Dr. Albertina Silvestre for evaluation of bilateral lower extremity edema, ulcerations and discomfort.  Symptoms are persistent/worsening and began approximately 2-3 years ago.  Location is bilateral lower extremities below the knees. Symptoms are worse at the end of the day.  History of venous interventions includes none.  Denies family history of venous disease.  Patient also denies DVT or cellulitis.  She also has a history of sarcoidosis with skin lesions of the upper and lower extremities.    Bilateral complete venous reflux study 04/18/2023, shows no evidence of DVT bilaterally.  The study also shows proximal reflux in the right great saphenous vein.  No other superficial reflux noted.    Venous Disease Medical Necessity Documentation Initial Visit Date: 4/18/2023 Return Check Date:    Have you ever had a rupture or bleed from a varicose vein  in your leg(s)?              [] Yes  [x] No   [] Yes   [] No   Have you ever been diagnosed with phlebitis, cellulitis, or inflammation in the area of the varicose veins of  your leg(s)?  [] Yes  [x] No    [] Yes   [] No   Do you have darkened or inflamed skin on your legs?   [x] Yes   [] No   [] Yes   [] No   Do you have leg swelling?     [x] Yes   [] No   [] Yes   [] No   Do you have leg pain? [x]   Yes   [] No   [] Yes   [] No   If yes, describe the type of pain?    []   Stabbing []  Radiating [x]  Aching   [x]  Tightness []  Throbbing               [x]  Burning []  Cramping              Do you have leg discomfort?   [x] Yes   [] No   [] Yes   [] No   If yes, describe the type of discomfort?    [x]  Heaviness [x]  Fullness   []  Restlessness [x] Tired/Fatigued [] Itching              Have you ever worn compression hose?   [x] Yes   [] No   [] Yes   [] No   If yes, how long? 1YR          Do you elevate your legs while sitting?   [x] Yes   [] No   [] Yes   [] No   Does venous disease (varicose veins, ulcers, skin changes, leg pain/swelling) interfere with your daily life?  If yes, check activities you are limited or unable to do.    [x]  Shower  [x]   Walk  []  Exercise  [] Play with children/grandchildren  []  Shop [] Work [x] Stand for any period of time [x] Sleep                               [x] Sitting for an extended period of time.           [x] Yes   [] No   [] Yes   [] No   Do you exercise/have you tried to exercise (i.e.  Walk our participate in a regular exercise routine)?  [] Yes  [x] No   [] Yes   [] No   BMI 46.5             Past Medical History:   Diagnosis Date    Collapse, lung     Diabetes mellitus, type 2     with Gastroparesis    Edema     Gastroparesis     Low back pain     Sarcoidosis     Lung and Cutaneous Sarcoid    Thyroid disease     Hypothyroid        Past Surgical History:   Procedure Laterality Date    CHEST TUBE INSERTION         Social History     Tobacco Use   Smoking Status Never    Smokeless Tobacco Never         Current Outpatient Medications:     amitriptyline (ELAVIL) 25 MG tablet, Take 25 mg by mouth every evening., Disp: , Rfl:     chlorhexidine (PERIDEX) 0.12 % solution, TAKE AS DIRECTED, Disp: , Rfl:     furosemide (LASIX) 20 MG tablet, Take 20 mg by mouth daily as needed., Disp: , Rfl:     hydrOXYchloroQUINE (PLAQUENIL) 200 mg tablet, TAKE 1 TABLET(200 MG) BY MOUTH EVERY DAY, Disp: 30 tablet, Rfl: 2    insulin lispro 100 unit/mL injection, Inject into the skin., Disp: , Rfl:     levothyroxine (SYNTHROID) 25 MCG tablet, Take 25 mcg by mouth before breakfast., Disp: , Rfl:     metoclopramide HCl 5 mg TbDL, Take 10 mg by mouth 4 (four) times daily., Disp: , Rfl:     nitrofurantoin, macrocrystal-monohydrate, (MACROBID) 100 MG capsule, Take 100 mg by mouth 2 (two) times daily., Disp: , Rfl:     potassium chloride SA (K-DUR,KLOR-CON) 20 MEQ tablet, Take 20 mEq by mouth 2 (two) times daily as needed., Disp: , Rfl:     predniSONE (DELTASONE) 5 MG tablet, Take 2 tablets (10 mg total) by mouth every other day. Tapering dose: ordered 6/14/21: 4>3>2>1:   1 week each dose, Disp: 30 tablet, Rfl: 5    triamcinolone acetonide 0.025% (KENALOG) 0.025 % Oint, Apply topically 2 (two) times daily. For the face, Disp: 454 g, Rfl: 5    triamcinolone acetonide 0.1% (KENALOG) 0.1 % ointment, Apply topically 2 (two) times daily. For the body, Disp: 454 g, Rfl: 5    albuterol (VENTOLIN HFA) 90 mcg/actuation inhaler, Inhale 2 puffs into the lungs every 6 (six) hours as needed for Wheezing. Rescue (Patient not taking: Reported on 8/15/2023), Disp: 18 g, Rfl: 5    budesonide-formoterol 160-4.5 mcg (SYMBICORT) 160-4.5 mcg/actuation HFAA, Inhale 2 puffs into the lungs every 12 (twelve) hours. Controller, Disp: 10.2 g, Rfl: 5    nystatin (MYCOSTATIN) powder, Apply topically 2 (two) times daily., Disp: , Rfl:     potassium chloride (K-TAB) 20 mEq, Take 1 tablet by mouth 2 (two) times daily., Disp: , Rfl:      predniSONE (DELTASONE) 5 MG tablet, Take 10 mg by mouth every other day., Disp: , Rfl:     Review of Systems   Constitutional:  Negative for activity change, chills, diaphoresis, fatigue and fever.   Respiratory:  Negative for cough and shortness of breath.    Cardiovascular:  Positive for leg swelling. Negative for chest pain and claudication.        Hyperpigmentation LE   Gastrointestinal:  Negative for nausea and vomiting.   Musculoskeletal:  Positive for leg pain. Negative for joint swelling.   Integumentary:  Negative for rash and wound.   Neurological:  Negative for weakness and numbness.          II. PHYSICAL EXAM     Physical Exam  Constitutional:       General: She is awake. She is not in acute distress.     Appearance: Normal appearance. She is obese. She is not ill-appearing or toxic-appearing.   HENT:      Head: Normocephalic and atraumatic.   Eyes:      Extraocular Movements: Extraocular movements intact.      Conjunctiva/sclera: Conjunctivae normal.      Pupils: Pupils are equal, round, and reactive to light.   Neck:      Vascular: No carotid bruit or JVD.   Cardiovascular:      Rate and Rhythm: Normal rate and regular rhythm.      Pulses:           Dorsalis pedis pulses are detected w/ Doppler on the right side and detected w/ Doppler on the left side.        Posterior tibial pulses are detected w/ Doppler on the right side and detected w/ Doppler on the left side.      Heart sounds: No murmur heard.  Pulmonary:      Effort: Pulmonary effort is normal. No respiratory distress.      Breath sounds: No stridor. No wheezing, rhonchi or rales.   Musculoskeletal:         General: No swelling, tenderness or deformity.      Right lower le+ Pitting Edema present.      Left lower le+ Pitting Edema present.      Comments: Edema with scattered superficial skin lesions bilaterally.  No evidence of active cellulitis or weeping.   Feet:      Comments: Triphasic hand-held dopplerable pulses of the bilateral  dorsalis pedis arteries.  Biphasic hand-held dopplerable pulses of the bilateral posterior tibial arteries.  Skin:     General: Skin is warm.      Capillary Refill: Capillary refill takes less than 2 seconds.      Coloration: Skin is not ashen.      Findings: No bruising, erythema, lesion, rash or wound.   Neurological:      Mental Status: She is alert and oriented to person, place, and time.      Motor: No weakness.   Psychiatric:         Speech: Speech normal.         Behavior: Behavior normal. Behavior is cooperative.       09/19/2023 Measurements  Trunk:  138cm  Rt:  thigh-73cm, calf-50cm, ankle-37cm  Lt:  thigh-73cm, calf-48.5cm, ankle-33cm    Reticular/Spider veins noted:  RLE: none  LLE: none    Varicose veins noted:  RLE: none  LLE:  none    CEAP Classification  Clinical Signs: Class 4 - Skin changes ascribed to venous disease  Etiologic Classification: Primary  Anatomic distribution: Superficial  Pathophysiologic dysfunction: Reflux                         Venous Clinical Severity Score  Pain:2=Daily, moderate activity limitation, occasional analgesics  Varicose Veins: 0=None  Venous Edema: 3=Morning edema above ankle and requiring activity change, elevation  Pigmentation: 1=Diffuse, but limited in area and old (brown)  Inflammation: 0=None  Induration: 1=Focal, circummalleolar (< 5 cm)  Number of Active Ulcers: 0=0  Active Ulceration, Duration: 0=None  Active Ulcer Size: 0=None  Compressive Therapy: 3=Full compliance, stockings + elevation  Total Score: 10       III. ASSESSMENT & PLAN (MEDICAL DECISION MAKING)     1. Sarcoid    2. Edema, lower extremity    3. Leg pain, bilateral    4. Hyperpigmentation of skin    5. Venous insufficiency    6. Hereditary lymphedema          Assessment/Diagnosis and Plan:  Previous Ultrasound of lower extremities reveals positive evidence of venous insufficiency in the right great saphenous vein.  Plan for conservative medical treatment at this time. The patient may benefit  from endovenous ablation in the future.  Her symptoms continue to be equally bilateral.    - continue 20-30 mm Hg compression stockings bilaterally.  - Therapeutic leg elevation  - Calf pumping exercises  - Patient with primary lymphedema of lower extremities. Patient presents with stage 2 primary lymphedema in both lower extremities with hyperplasia present. She has swelling in her lower abdomen along with both legs. She has been compliant with conservative therapies including compression stockings, elevation and a home exercise program for at least 4 weeks yet her swelling persists. We are recommending a compression pump as an adjunct to conservative therapies to better manage her swelling. Due to abdominal swelling as well as the lower extremities, she will require an advanced pump that includes a truncal component to decongest both her extremities and trunk.           Hilario Lizama, DO

## 2023-09-27 DIAGNOSIS — N18.9 CKD (CHRONIC KIDNEY DISEASE): Primary | ICD-10-CM

## 2023-09-27 DIAGNOSIS — D86.9 SARCOIDOSIS: Primary | ICD-10-CM

## 2023-09-29 DIAGNOSIS — D86.9 SARCOIDOSIS: Primary | ICD-10-CM

## 2023-10-31 ENCOUNTER — OFFICE VISIT (OUTPATIENT)
Dept: DERMATOLOGY | Facility: CLINIC | Age: 53
End: 2023-10-31
Payer: COMMERCIAL

## 2023-10-31 DIAGNOSIS — D86.9 SARCOIDOSIS: ICD-10-CM

## 2023-10-31 DIAGNOSIS — Z79.899 HIGH RISK MEDICATION USE: Primary | ICD-10-CM

## 2023-10-31 PROCEDURE — 99214 OFFICE O/P EST MOD 30 MIN: CPT | Mod: ,,, | Performed by: STUDENT IN AN ORGANIZED HEALTH CARE EDUCATION/TRAINING PROGRAM

## 2023-10-31 PROCEDURE — 1159F MED LIST DOCD IN RCRD: CPT | Mod: CPTII,,, | Performed by: STUDENT IN AN ORGANIZED HEALTH CARE EDUCATION/TRAINING PROGRAM

## 2023-10-31 PROCEDURE — 1159F PR MEDICATION LIST DOCUMENTED IN MEDICAL RECORD: ICD-10-PCS | Mod: CPTII,,, | Performed by: STUDENT IN AN ORGANIZED HEALTH CARE EDUCATION/TRAINING PROGRAM

## 2023-10-31 PROCEDURE — 99214 PR OFFICE/OUTPT VISIT, EST, LEVL IV, 30-39 MIN: ICD-10-PCS | Mod: ,,, | Performed by: STUDENT IN AN ORGANIZED HEALTH CARE EDUCATION/TRAINING PROGRAM

## 2023-10-31 RX ORDER — HYDROXYCHLOROQUINE SULFATE 200 MG/1
200 TABLET, FILM COATED ORAL DAILY
Qty: 90 TABLET | Refills: 2 | Status: SHIPPED | OUTPATIENT
Start: 2023-10-31

## 2023-10-31 RX ORDER — HYDROCORTISONE 25 MG/G
CREAM TOPICAL 2 TIMES DAILY
Qty: 28 G | Refills: 5 | Status: SHIPPED | OUTPATIENT
Start: 2023-10-31

## 2023-10-31 RX ORDER — CLOBETASOL PROPIONATE 0.5 MG/G
OINTMENT TOPICAL 2 TIMES DAILY
Qty: 60 G | Refills: 5 | Status: SHIPPED | OUTPATIENT
Start: 2023-10-31 | End: 2023-11-01

## 2023-10-31 NOTE — PROGRESS NOTES
[facility]  Payam Cortes MD    [address] [place] [address]  [phone]    Fax: [phone]    Patient Name: Zahra Gandara  Medical Record Number: [id number]  PCP: Albertina Silvestre DO  Age: 53 y.o. : 1970  Contact: [phone] (home)     History of Present Illness:     Zahra Gandara is a 53 y.o.  female here for follow up of sarcoidosis being treated with oral Prednisone 5 mg every other day. She was last seen in  and the plan was to start plaquenil, but she was lost to follow up. She continues to follow with Dr. Gimenez. Her pulmonary symptoms have been persistent. She has continued to develop more skin lesions, with a large majority of R forearm and now mid face involved with cutaneous sarcoidosis. She has DM2, stage 3 CKD.     The patient has no other concerns today.    Review of Systems:     Unremarkable other than mentioned above.     Physical Exam:     General: Relaxed, oriented, alert    Skin examination of the scalp, face, neck, chest, back, abdomen, upper extremities and lower extremities were normal except for as listed below      Assessment and Plan:     1.  Sarcoidosis   - Plaquenil 200 mg daily  - clobetasol ointment for body  - hydrocortisone 2.5% for face  - CBC, CMP when she sees Dr. Gimenez on    - discussed need for baseline ophtho eval     2. High Risk Medication Monitoring : The risks and benefits of the medication were reviewed in full with the patient. Should any side effects occur, the patient will stop the medication and contact me immediately.      Plaquenil Counseling: I discussed with the patient that a baseline ophthalmologic exam is needed at the start of therapy and every year thereafter  while on therapy. A CBC may also be warranted for monitoring. The side effects of this medication were discussed with the patient, including but  not limited to agranulocytosis, aplastic anemia, seizures, rashes, retinopathy,  and liver toxicity. Patient instructed to call the office should any  adverse effect occur. The patient verbalized understanding of the proper use and possible adverse effects of Plaquenil. All the patient's questions  and concerns were addressed.    Return to clinic in 3 months    AVS printed with patient instructions     Payam Cortes MD   Mohs Surgery/Dermatologic Oncology  Dermatology

## 2023-11-01 RX ORDER — HALOBETASOL PROPIONATE 0.5 MG/G
CREAM TOPICAL 2 TIMES DAILY
Qty: 50 G | Refills: 5 | Status: SHIPPED | OUTPATIENT
Start: 2023-11-01

## 2023-11-08 ENCOUNTER — OFFICE VISIT (OUTPATIENT)
Dept: PULMONOLOGY | Facility: CLINIC | Age: 53
End: 2023-11-08
Payer: COMMERCIAL

## 2023-11-08 VITALS
HEART RATE: 84 BPM | DIASTOLIC BLOOD PRESSURE: 50 MMHG | SYSTOLIC BLOOD PRESSURE: 104 MMHG | HEIGHT: 64 IN | BODY MASS INDEX: 47.46 KG/M2 | RESPIRATION RATE: 16 BRPM | WEIGHT: 278 LBS | OXYGEN SATURATION: 98 %

## 2023-11-08 DIAGNOSIS — D86.9 SARCOID: Primary | ICD-10-CM

## 2023-11-08 PROCEDURE — 3078F PR MOST RECENT DIASTOLIC BLOOD PRESSURE < 80 MM HG: ICD-10-PCS | Mod: CPTII,,, | Performed by: INTERNAL MEDICINE

## 2023-11-08 PROCEDURE — 3008F PR BODY MASS INDEX (BMI) DOCUMENTED: ICD-10-PCS | Mod: CPTII,,, | Performed by: INTERNAL MEDICINE

## 2023-11-08 PROCEDURE — 3078F DIAST BP <80 MM HG: CPT | Mod: CPTII,,, | Performed by: INTERNAL MEDICINE

## 2023-11-08 PROCEDURE — 1159F PR MEDICATION LIST DOCUMENTED IN MEDICAL RECORD: ICD-10-PCS | Mod: CPTII,,, | Performed by: INTERNAL MEDICINE

## 2023-11-08 PROCEDURE — 3074F PR MOST RECENT SYSTOLIC BLOOD PRESSURE < 130 MM HG: ICD-10-PCS | Mod: CPTII,,, | Performed by: INTERNAL MEDICINE

## 2023-11-08 PROCEDURE — 99213 OFFICE O/P EST LOW 20 MIN: CPT | Mod: S$PBB,,, | Performed by: INTERNAL MEDICINE

## 2023-11-08 PROCEDURE — 99215 OFFICE O/P EST HI 40 MIN: CPT | Mod: PBBFAC | Performed by: INTERNAL MEDICINE

## 2023-11-08 PROCEDURE — 1159F MED LIST DOCD IN RCRD: CPT | Mod: CPTII,,, | Performed by: INTERNAL MEDICINE

## 2023-11-08 PROCEDURE — 99213 PR OFFICE/OUTPT VISIT, EST, LEVL III, 20-29 MIN: ICD-10-PCS | Mod: S$PBB,,, | Performed by: INTERNAL MEDICINE

## 2023-11-08 PROCEDURE — 3008F BODY MASS INDEX DOCD: CPT | Mod: CPTII,,, | Performed by: INTERNAL MEDICINE

## 2023-11-08 PROCEDURE — 3074F SYST BP LT 130 MM HG: CPT | Mod: CPTII,,, | Performed by: INTERNAL MEDICINE

## 2023-11-08 RX ORDER — LEVOTHYROXINE SODIUM 50 UG/1
50 TABLET ORAL
COMMUNITY
Start: 2023-10-13

## 2023-11-08 RX ORDER — PREDNISONE 20 MG/1
20 TABLET ORAL DAILY
Qty: 30 TABLET | Refills: 5 | Status: SHIPPED | OUTPATIENT
Start: 2023-11-08

## 2023-11-08 NOTE — PROGRESS NOTES
Subjective:       Patient ID: Zahra Gandara is a 53 y.o. female.    Chief Complaint: Sarcoidosis (Patient states condition has gotten worse.)    Sarcoidosis  This is a chronic problem. The current episode started more than 1 month ago. The problem has been unchanged. Pertinent negatives include no abdominal pain, arthralgias, chest pain, chills, congestion, headaches or rash.     Past Medical History:   Diagnosis Date    Collapse, lung     Diabetes mellitus, type 2     with Gastroparesis    Edema     Gastroparesis     Low back pain     Sarcoidosis     Lung and Cutaneous Sarcoid    Thyroid disease     Hypothyroid     Past Surgical History:   Procedure Laterality Date    CHEST TUBE INSERTION       Family History   Problem Relation Age of Onset    Diabetes Mother     Hypertension Mother     Hypertension Father     Diabetes Sister     Diabetes Sister         Pre-diabetes    No Known Problems Brother     No Known Problems Brother      Review of patient's allergies indicates:   Allergen Reactions    Bactrim [sulfamethoxazole-trimethoprim] Hives    Pcn [penicillins] Hives      Social History     Tobacco Use    Smoking status: Never    Smokeless tobacco: Never   Substance Use Topics    Alcohol use: Yes     Comment: rarely, with celebrations    Drug use: Never      Review of Systems   Constitutional:  Negative for chills, activity change and night sweats.   HENT:  Negative for congestion and ear pain.    Eyes:  Negative for redness and itching.   Cardiovascular:  Negative for chest pain and palpitations.   Musculoskeletal:  Negative for arthralgias and back pain.   Skin:  Negative for rash.   Gastrointestinal:  Negative for abdominal pain and abdominal distention.   Neurological:  Negative for dizziness and headaches.   Hematological:  Negative for adenopathy. Does not bruise/bleed easily.   Psychiatric/Behavioral:  Negative for confusion. The patient is not nervous/anxious.        Objective:      Physical Exam  "  Constitutional: She is oriented to person, place, and time. She appears well-developed and well-nourished.   HENT:   Head: Normocephalic.   Nose: Nose normal.   Mouth/Throat: Oropharynx is clear and moist.   Neck: No JVD present. No thyromegaly present.   Cardiovascular: Normal rate, regular rhythm, normal heart sounds and intact distal pulses.   Pulmonary/Chest: Normal expansion, hyperinflation, symmetric chest wall expansion, effort normal and breath sounds normal.   Abdominal: Soft. Bowel sounds are normal.   Musculoskeletal:         General: Normal range of motion.      Cervical back: Normal range of motion and neck supple.   Lymphadenopathy: No supraclavicular adenopathy is present.     She has no cervical adenopathy.   Neurological: She is alert and oriented to person, place, and time. She has normal reflexes.   Skin: Skin is warm and dry.   Psychiatric: She has a normal mood and affect. Her behavior is normal.     Personal Diagnostic Review  none pertinent        11/8/2023     2:14 PM 9/19/2023    11:24 AM 8/15/2023    11:00 AM 7/18/2023     2:04 PM 5/16/2023     1:38 PM 4/18/2023    10:56 AM 3/7/2022     2:33 PM   Pulmonary Function Tests   SpO2 98 %      97 %   Height 5' 4" (1.626 m) 5' 4" (1.626 m) 5' 4" (1.626 m) 5' 4" (1.626 m) 5' 4" (1.626 m) 5' 4" (1.626 m) 5' 4" (1.626 m)   Weight 126.1 kg (278 lb) 126.1 kg (278 lb) 126.2 kg (278 lb 3.2 oz) 125.1 kg (275 lb 12.8 oz) 126.1 kg (278 lb) 122.9 kg (271 lb) 122.9 kg (271 lb)   BMI (Calculated) 47.7 47.7 47.7 47.3 47.7 46.5 46.5         Assessment:       1. Sarcoid        Outpatient Encounter Medications as of 11/8/2023   Medication Sig Dispense Refill    amitriptyline (ELAVIL) 25 MG tablet Take 25 mg by mouth every evening.      furosemide (LASIX) 20 MG tablet Take 20 mg by mouth daily as needed.      halobetasol (ULTRAVATE) 0.05 % cream Apply topically 2 (two) times daily. 50 g 5    hydrocortisone 2.5 % cream Apply topically 2 (two) times daily. For the " face 28 g 5    hydroxychloroquine (PLAQUENIL) 200 mg tablet Take 1 tablet (200 mg total) by mouth once daily. 90 tablet 2    levothyroxine (SYNTHROID) 50 MCG tablet Take 50 mcg by mouth.      metoclopramide HCl 5 mg TbDL Take 10 mg by mouth 4 (four) times daily.      predniSONE (DELTASONE) 5 MG tablet Take 2 tablets (10 mg total) by mouth every other day. Tapering dose: ordered 6/14/21: 4>3>2>1:   1 week each dose 30 tablet 5    triamcinolone acetonide 0.025% (KENALOG) 0.025 % Oint Apply topically 2 (two) times daily. For the face 454 g 5    albuterol (VENTOLIN HFA) 90 mcg/actuation inhaler Inhale 2 puffs into the lungs every 6 (six) hours as needed for Wheezing. Rescue (Patient not taking: Reported on 11/8/2023) 18 g 5    budesonide-formoterol 160-4.5 mcg (SYMBICORT) 160-4.5 mcg/actuation HFAA Inhale 2 puffs into the lungs every 12 (twelve) hours. Controller (Patient not taking: Reported on 11/8/2023) 10.2 g 5    chlorhexidine (PERIDEX) 0.12 % solution TAKE AS DIRECTED      insulin lispro 100 unit/mL injection Inject into the skin.      levothyroxine (SYNTHROID) 25 MCG tablet Take 25 mcg by mouth before breakfast.      nitrofurantoin, macrocrystal-monohydrate, (MACROBID) 100 MG capsule Take 100 mg by mouth 2 (two) times daily.      nystatin (MYCOSTATIN) powder Apply topically 2 (two) times daily.      potassium chloride (K-TAB) 20 mEq Take 1 tablet by mouth 2 (two) times daily.      potassium chloride SA (K-DUR,KLOR-CON) 20 MEQ tablet Take 20 mEq by mouth 2 (two) times daily as needed.      predniSONE (DELTASONE) 20 MG tablet Take 1 tablet (20 mg total) by mouth once daily. 30 tablet 5    predniSONE (DELTASONE) 5 MG tablet Take 10 mg by mouth every other day.      [DISCONTINUED] clobetasol 0.05% (TEMOVATE) 0.05 % Oint Apply topically 2 (two) times daily. For the body 60 g 5    [DISCONTINUED] hydrOXYchloroQUINE (PLAQUENIL) 200 mg tablet TAKE 1 TABLET(200 MG) BY MOUTH EVERY DAY 30 tablet 2    [DISCONTINUED]  triamcinolone acetonide 0.1% (KENALOG) 0.1 % ointment Apply topically 2 (two) times daily. For the body 454 g 5     No facility-administered encounter medications on file as of 11/8/2023.     Orders Placed This Encounter   Procedures    X-Ray Chest PA And Lateral     Standing Status:   Future     Standing Expiration Date:   11/8/2024     Order Specific Question:   May the Radiologist modify the order per protocol to meet the clinical needs of the patient?     Answer:   Yes       Plan:       Problem List Items Addressed This Visit          Immunology/Multi System    Sarcoid - Primary     Patient has skin lesions in his having difficulty currently will go from 5 mg every other day to 20 mg every other day will see her back in 1 month repeat chest x-ray when she comes back show little bit more shortness of breath         Relevant Medications    predniSONE (DELTASONE) 20 MG tablet    Other Relevant Orders    X-Ray Chest PA And Lateral

## 2023-11-08 NOTE — ASSESSMENT & PLAN NOTE
Patient has skin lesions in his having difficulty currently will go from 5 mg every other day to 20 mg every other day will see her back in 1 month repeat chest x-ray when she comes back show little bit more shortness of breath

## 2023-11-21 ENCOUNTER — OFFICE VISIT (OUTPATIENT)
Dept: VASCULAR SURGERY | Facility: CLINIC | Age: 53
End: 2023-11-21
Payer: COMMERCIAL

## 2023-11-21 VITALS
RESPIRATION RATE: 18 BRPM | WEIGHT: 278 LBS | DIASTOLIC BLOOD PRESSURE: 70 MMHG | BODY MASS INDEX: 47.72 KG/M2 | HEART RATE: 69 BPM | SYSTOLIC BLOOD PRESSURE: 117 MMHG

## 2023-11-21 DIAGNOSIS — I87.2 VENOUS INSUFFICIENCY: ICD-10-CM

## 2023-11-21 DIAGNOSIS — M79.604 LEG PAIN, BILATERAL: ICD-10-CM

## 2023-11-21 DIAGNOSIS — Q82.0 HEREDITARY LYMPHEDEMA: ICD-10-CM

## 2023-11-21 DIAGNOSIS — D86.9 SARCOID: Primary | ICD-10-CM

## 2023-11-21 DIAGNOSIS — R60.0 EDEMA, LOWER EXTREMITY: ICD-10-CM

## 2023-11-21 DIAGNOSIS — L81.9 HYPERPIGMENTATION OF SKIN: ICD-10-CM

## 2023-11-21 DIAGNOSIS — M79.605 LEG PAIN, BILATERAL: ICD-10-CM

## 2023-11-21 PROCEDURE — 1159F MED LIST DOCD IN RCRD: CPT | Mod: CPTII,,, | Performed by: FAMILY MEDICINE

## 2023-11-21 PROCEDURE — 3078F PR MOST RECENT DIASTOLIC BLOOD PRESSURE < 80 MM HG: ICD-10-PCS | Mod: CPTII,,, | Performed by: FAMILY MEDICINE

## 2023-11-21 PROCEDURE — 3008F PR BODY MASS INDEX (BMI) DOCUMENTED: ICD-10-PCS | Mod: CPTII,,, | Performed by: FAMILY MEDICINE

## 2023-11-21 PROCEDURE — 3074F SYST BP LT 130 MM HG: CPT | Mod: CPTII,,, | Performed by: FAMILY MEDICINE

## 2023-11-21 PROCEDURE — 3078F DIAST BP <80 MM HG: CPT | Mod: CPTII,,, | Performed by: FAMILY MEDICINE

## 2023-11-21 PROCEDURE — 3008F BODY MASS INDEX DOCD: CPT | Mod: CPTII,,, | Performed by: FAMILY MEDICINE

## 2023-11-21 PROCEDURE — 3074F PR MOST RECENT SYSTOLIC BLOOD PRESSURE < 130 MM HG: ICD-10-PCS | Mod: CPTII,,, | Performed by: FAMILY MEDICINE

## 2023-11-21 PROCEDURE — 1160F RVW MEDS BY RX/DR IN RCRD: CPT | Mod: CPTII,,, | Performed by: FAMILY MEDICINE

## 2023-11-21 PROCEDURE — 99213 PR OFFICE/OUTPT VISIT, EST, LEVL III, 20-29 MIN: ICD-10-PCS | Mod: ,,, | Performed by: FAMILY MEDICINE

## 2023-11-21 PROCEDURE — 1160F PR REVIEW ALL MEDS BY PRESCRIBER/CLIN PHARMACIST DOCUMENTED: ICD-10-PCS | Mod: CPTII,,, | Performed by: FAMILY MEDICINE

## 2023-11-21 PROCEDURE — 99213 OFFICE O/P EST LOW 20 MIN: CPT | Mod: ,,, | Performed by: FAMILY MEDICINE

## 2023-11-21 PROCEDURE — 1159F PR MEDICATION LIST DOCUMENTED IN MEDICAL RECORD: ICD-10-PCS | Mod: CPTII,,, | Performed by: FAMILY MEDICINE

## 2023-11-21 NOTE — PROGRESS NOTES
[facility] CLINIC NOTE    Patient ID: Zahra Gandara is a 53 y.o. female.    I. HISTORY     Chief Complaint:   Chief Complaint   Patient presents with    Follow-up     Room 4. 3M VENOUS        HPI: Zahra Gandara is a 53 y.o. female who presents to clinic today for a 2-month follow-up and compression check.  The patient states she continues to do well with 20-30 millimeter of mercury compression stockings.  She states these have helped to reduce the edema somewhat, but not totally resolve it.  She is also supposed to be following up with a lymphatic pump device, however this was not ordered in time and it is still pending.  She is not received it.  Her symptoms continues to be equal lateral and she has venous reflux in the right lower extremity.  She does not feel like her vein symptoms of warranting any correctional procedures at this time.  She continues with her sarcoid treatment and was recently increased on her steroid dose.    Clinical summary:  Patient initially seen on 04/18/2023 by referral from Dr. Albertina Silvestre for evaluation of bilateral lower extremity edema, ulcerations and discomfort.  Symptoms are persistent/worsening and began approximately 2-3 years ago.  Location is bilateral lower extremities below the knees. Symptoms are worse at the end of the day.  History of venous interventions includes none.  Denies family history of venous disease.  Patient also denies DVT or cellulitis.  She also has a history of sarcoidosis with skin lesions of the upper and lower extremities.    Bilateral complete venous reflux study 04/18/2023, shows no evidence of DVT bilaterally.  The study also shows proximal reflux in the right great saphenous vein.  No other superficial reflux noted.    Venous Disease Medical Necessity Documentation Initial Visit Date: 4/18/2023 Return Check Date:    Have you ever had a rupture or bleed from a varicose vein in your leg(s)?              [] Yes  [x] No   [] Yes   [] No   Have  you ever been diagnosed with phlebitis, cellulitis, or inflammation in the area of the varicose veins of  your leg(s)?  [] Yes  [x] No    [] Yes   [] No   Do you have darkened or inflamed skin on your legs?   [x] Yes   [] No   [] Yes   [] No   Do you have leg swelling?     [x] Yes   [] No   [] Yes   [] No   Do you have leg pain? [x]   Yes   [] No   [] Yes   [] No   If yes, describe the type of pain?    []   Stabbing []  Radiating [x]  Aching   [x]  Tightness []  Throbbing               [x]  Burning []  Cramping              Do you have leg discomfort?   [x] Yes   [] No   [] Yes   [] No   If yes, describe the type of discomfort?    [x]  Heaviness [x]  Fullness   []  Restlessness [x] Tired/Fatigued [] Itching              Have you ever worn compression hose?   [x] Yes   [] No   [] Yes   [] No   If yes, how long? 1YR          Do you elevate your legs while sitting?   [x] Yes   [] No   [] Yes   [] No   Does venous disease (varicose veins, ulcers, skin changes, leg pain/swelling) interfere with your daily life?  If yes, check activities you are limited or unable to do.    [x]  Shower  [x]   Walk  []  Exercise  [] Play with children/grandchildren  []  Shop [] Work [x] Stand for any period of time [x] Sleep                               [x] Sitting for an extended period of time.           [x] Yes   [] No   [] Yes   [] No   Do you exercise/have you tried to exercise (i.e.  Walk our participate in a regular exercise routine)?  [] Yes  [x] No   [] Yes   [] No   BMI 46.5             Past Medical History:   Diagnosis Date    Collapse, lung     Diabetes mellitus, type 2     with Gastroparesis    Edema     Gastroparesis     Low back pain     Sarcoidosis     Lung and Cutaneous Sarcoid    Thyroid disease     Hypothyroid        Past Surgical History:   Procedure Laterality Date    CHEST TUBE INSERTION         Social History     Tobacco Use   Smoking Status Never   Smokeless Tobacco Never         Current Outpatient Medications:      albuterol (VENTOLIN HFA) 90 mcg/actuation inhaler, Inhale 2 puffs into the lungs every 6 (six) hours as needed for Wheezing. Rescue, Disp: 18 g, Rfl: 5    amitriptyline (ELAVIL) 25 MG tablet, Take 25 mg by mouth every evening., Disp: , Rfl:     chlorhexidine (PERIDEX) 0.12 % solution, TAKE AS DIRECTED, Disp: , Rfl:     furosemide (LASIX) 20 MG tablet, Take 20 mg by mouth daily as needed., Disp: , Rfl:     halobetasol (ULTRAVATE) 0.05 % cream, Apply topically 2 (two) times daily., Disp: 50 g, Rfl: 5    hydrocortisone 2.5 % cream, Apply topically 2 (two) times daily. For the face, Disp: 28 g, Rfl: 5    hydroxychloroquine (PLAQUENIL) 200 mg tablet, Take 1 tablet (200 mg total) by mouth once daily., Disp: 90 tablet, Rfl: 2    insulin lispro 100 unit/mL injection, Inject into the skin., Disp: , Rfl:     levothyroxine (SYNTHROID) 25 MCG tablet, Take 25 mcg by mouth before breakfast., Disp: , Rfl:     levothyroxine (SYNTHROID) 50 MCG tablet, Take 50 mcg by mouth., Disp: , Rfl:     metoclopramide HCl 5 mg TbDL, Take 10 mg by mouth 4 (four) times daily., Disp: , Rfl:     nitrofurantoin, macrocrystal-monohydrate, (MACROBID) 100 MG capsule, Take 100 mg by mouth 2 (two) times daily., Disp: , Rfl:     potassium chloride (K-TAB) 20 mEq, Take 1 tablet by mouth 2 (two) times daily., Disp: , Rfl:     potassium chloride SA (K-DUR,KLOR-CON) 20 MEQ tablet, Take 20 mEq by mouth 2 (two) times daily as needed., Disp: , Rfl:     predniSONE (DELTASONE) 20 MG tablet, Take 1 tablet (20 mg total) by mouth once daily., Disp: 30 tablet, Rfl: 5    predniSONE (DELTASONE) 5 MG tablet, Take 2 tablets (10 mg total) by mouth every other day. Tapering dose: ordered 6/14/21: 4>3>2>1:   1 week each dose, Disp: 30 tablet, Rfl: 5    predniSONE (DELTASONE) 5 MG tablet, Take 10 mg by mouth every other day., Disp: , Rfl:     triamcinolone acetonide 0.025% (KENALOG) 0.025 % Oint, Apply topically 2 (two) times daily. For the face, Disp: 454 g, Rfl: 5     budesonide-formoterol 160-4.5 mcg (SYMBICORT) 160-4.5 mcg/actuation HFAA, Inhale 2 puffs into the lungs every 12 (twelve) hours. Controller (Patient not taking: Reported on 2023), Disp: 10.2 g, Rfl: 5    nystatin (MYCOSTATIN) powder, Apply topically 2 (two) times daily., Disp: , Rfl:     Review of Systems   Constitutional:  Negative for activity change, chills, diaphoresis, fatigue and fever.   Respiratory:  Negative for cough and shortness of breath.    Cardiovascular:  Positive for leg swelling. Negative for chest pain and claudication.        Hyperpigmentation LE   Gastrointestinal:  Negative for nausea and vomiting.   Musculoskeletal:  Positive for leg pain. Negative for joint swelling.   Integumentary:  Negative for rash and wound.   Neurological:  Negative for weakness and numbness.          II. PHYSICAL EXAM     Physical Exam  Constitutional:       General: She is awake. She is not in acute distress.     Appearance: Normal appearance. She is obese. She is not ill-appearing or toxic-appearing.   HENT:      Head: Normocephalic and atraumatic.   Eyes:      Extraocular Movements: Extraocular movements intact.      Conjunctiva/sclera: Conjunctivae normal.      Pupils: Pupils are equal, round, and reactive to light.   Neck:      Vascular: No carotid bruit or JVD.   Cardiovascular:      Rate and Rhythm: Normal rate and regular rhythm.      Pulses:           Dorsalis pedis pulses are detected w/ Doppler on the right side and detected w/ Doppler on the left side.        Posterior tibial pulses are detected w/ Doppler on the right side and detected w/ Doppler on the left side.      Heart sounds: No murmur heard.  Pulmonary:      Effort: Pulmonary effort is normal. No respiratory distress.      Breath sounds: No stridor. No wheezing, rhonchi or rales.   Musculoskeletal:         General: No swelling, tenderness or deformity.      Right lower le+ Pitting Edema present.      Left lower le+ Pitting Edema  present.      Comments: Edema with scattered superficial skin lesions bilaterally.  No evidence of active cellulitis or weeping.   Feet:      Comments: Triphasic hand-held dopplerable pulses of the bilateral dorsalis pedis arteries.  Biphasic hand-held dopplerable pulses of the bilateral posterior tibial arteries.  Skin:     General: Skin is warm.      Capillary Refill: Capillary refill takes less than 2 seconds.      Coloration: Skin is not ashen.      Findings: No bruising, erythema, lesion, rash or wound.   Neurological:      Mental Status: She is alert and oriented to person, place, and time.      Motor: No weakness.   Psychiatric:         Speech: Speech normal.         Behavior: Behavior normal. Behavior is cooperative.       09/19/2023 Measurements  Trunk:  138cm  Rt:  thigh-73cm, calf-50cm, ankle-37cm  Lt:  thigh-73cm, calf-48.5cm, ankle-33cm    Reticular/Spider veins noted:  RLE: none  LLE: none    Varicose veins noted:  RLE: none  LLE:  none    CEAP Classification  Clinical Signs: Class 4 - Skin changes ascribed to venous disease  Etiologic Classification: Primary  Anatomic distribution: Superficial  Pathophysiologic dysfunction: Reflux                         Venous Clinical Severity Score  Pain:2=Daily, moderate activity limitation, occasional analgesics  Varicose Veins: 0=None  Venous Edema: 3=Morning edema above ankle and requiring activity change, elevation  Pigmentation: 1=Diffuse, but limited in area and old (brown)  Inflammation: 0=None  Induration: 1=Focal, circummalleolar (< 5 cm)  Number of Active Ulcers: 0=0  Active Ulceration, Duration: 0=None  Active Ulcer Size: 0=None  Compressive Therapy: 3=Full compliance, stockings + elevation  Total Score: 10       III. ASSESSMENT & PLAN (MEDICAL DECISION MAKING)     1. Sarcoid    2. Edema, lower extremity    3. Leg pain, bilateral    4. Hereditary lymphedema    5. Venous insufficiency    6. Hyperpigmentation of skin        Assessment/Diagnosis and  Plan:  Previous Ultrasound of lower extremities reveals positive evidence of venous insufficiency in the right great saphenous vein.  Plan for conservative medical treatment at this time. The patient may benefit from endovenous ablation in the future.  Her symptoms continue to be equally bilateral.    - continue 20-30 mm Hg compression stockings bilaterally.  - Therapeutic leg elevation  - Calf pumping exercises  - Patient with primary lymphedema of lower extremities. Patient presents with stage 2 primary lymphedema in both lower extremities with hyperplasia present. She has swelling in her lower abdomen along with both legs. She has been compliant with conservative therapies including compression stockings, elevation and a home exercise program for at least 4 weeks yet her swelling persists. We are recommending a compression pump as an adjunct to conservative therapies to better manage her swelling. Due to abdominal swelling as well as the lower extremities, she will require an advanced pump that includes a truncal component to decongest both her extremities and trunk.         Hilario Lizama, DO

## 2024-02-06 ENCOUNTER — OFFICE VISIT (OUTPATIENT)
Dept: FAMILY MEDICINE | Facility: CLINIC | Age: 54
End: 2024-02-06
Payer: COMMERCIAL

## 2024-02-06 VITALS
HEIGHT: 64 IN | SYSTOLIC BLOOD PRESSURE: 117 MMHG | HEART RATE: 62 BPM | OXYGEN SATURATION: 99 % | RESPIRATION RATE: 21 BRPM | TEMPERATURE: 98 F | DIASTOLIC BLOOD PRESSURE: 74 MMHG | BODY MASS INDEX: 48.01 KG/M2 | WEIGHT: 281.19 LBS

## 2024-02-06 DIAGNOSIS — R30.0 DYSURIA: ICD-10-CM

## 2024-02-06 DIAGNOSIS — E87.6 HYPOKALEMIA: ICD-10-CM

## 2024-02-06 DIAGNOSIS — E03.9 HYPOTHYROIDISM, UNSPECIFIED TYPE: ICD-10-CM

## 2024-02-06 DIAGNOSIS — Z13.220 SCREENING FOR LIPID DISORDERS: ICD-10-CM

## 2024-02-06 DIAGNOSIS — R11.0 NAUSEA: ICD-10-CM

## 2024-02-06 DIAGNOSIS — N39.0 URINARY TRACT INFECTION WITHOUT HEMATURIA, SITE UNSPECIFIED: Primary | ICD-10-CM

## 2024-02-06 DIAGNOSIS — N18.31 TYPE 2 DIABETES MELLITUS WITH STAGE 3A CHRONIC KIDNEY DISEASE, WITHOUT LONG-TERM CURRENT USE OF INSULIN: ICD-10-CM

## 2024-02-06 DIAGNOSIS — E11.22 TYPE 2 DIABETES MELLITUS WITH STAGE 3A CHRONIC KIDNEY DISEASE, WITHOUT LONG-TERM CURRENT USE OF INSULIN: ICD-10-CM

## 2024-02-06 LAB
ALBUMIN SERPL BCP-MCNC: 3.3 G/DL (ref 3.5–5)
ALBUMIN/GLOB SERPL: 0.8 {RATIO}
ALP SERPL-CCNC: 77 U/L (ref 41–108)
ALT SERPL W P-5'-P-CCNC: 11 U/L (ref 13–56)
ANION GAP SERPL CALCULATED.3IONS-SCNC: 9 MMOL/L (ref 7–16)
ANISOCYTOSIS BLD QL SMEAR: ABNORMAL
AST SERPL W P-5'-P-CCNC: 9 U/L (ref 15–37)
BASOPHILS # BLD AUTO: 0.09 K/UL (ref 0–0.2)
BASOPHILS NFR BLD AUTO: 1 % (ref 0–1)
BILIRUB SERPL-MCNC: 0.2 MG/DL (ref ?–1.2)
BILIRUB SERPL-MCNC: ABNORMAL MG/DL
BLOOD URINE, POC: ABNORMAL
BUN SERPL-MCNC: 19 MG/DL (ref 7–18)
BUN/CREAT SERPL: 14 (ref 6–20)
CALCIUM SERPL-MCNC: 9.1 MG/DL (ref 8.5–10.1)
CHLORIDE SERPL-SCNC: 107 MMOL/L (ref 98–107)
CHOLEST SERPL-MCNC: 156 MG/DL (ref 0–200)
CHOLEST/HDLC SERPL: 4.2 {RATIO}
CO2 SERPL-SCNC: 29 MMOL/L (ref 21–32)
COLOR, POC UA: YELLOW
CREAT SERPL-MCNC: 1.4 MG/DL (ref 0.55–1.02)
DIFFERENTIAL METHOD BLD: ABNORMAL
EGFR (NO RACE VARIABLE) (RUSH/TITUS): 45 ML/MIN/1.73M2
EOSINOPHIL # BLD AUTO: 0.36 K/UL (ref 0–0.5)
EOSINOPHIL NFR BLD AUTO: 3.9 % (ref 1–4)
ERYTHROCYTE [DISTWIDTH] IN BLOOD BY AUTOMATED COUNT: 16.7 % (ref 11.5–14.5)
GLOBULIN SER-MCNC: 4.2 G/DL (ref 2–4)
GLUCOSE SERPL-MCNC: 71 MG/DL (ref 74–106)
GLUCOSE UR QL STRIP: ABNORMAL
HCT VFR BLD AUTO: 34.8 % (ref 38–47)
HDLC SERPL-MCNC: 37 MG/DL (ref 40–60)
HGB BLD-MCNC: 10.8 G/DL (ref 12–16)
HYPOCHROMIA BLD QL SMEAR: ABNORMAL
IMM GRANULOCYTES # BLD AUTO: 0.11 K/UL (ref 0–0.04)
IMM GRANULOCYTES NFR BLD: 1.2 % (ref 0–0.4)
KETONES UR QL STRIP: ABNORMAL
LDLC SERPL CALC-MCNC: 98 MG/DL
LDLC/HDLC SERPL: 2.6 {RATIO}
LEUKOCYTE ESTERASE URINE, POC: ABNORMAL
LYMPHOCYTES # BLD AUTO: 1.73 K/UL (ref 1–4.8)
LYMPHOCYTES NFR BLD AUTO: 18.6 % (ref 27–41)
MCH RBC QN AUTO: 22.5 PG (ref 27–31)
MCHC RBC AUTO-ENTMCNC: 31 G/DL (ref 32–36)
MCV RBC AUTO: 72.3 FL (ref 80–96)
MICROCYTES BLD QL SMEAR: ABNORMAL
MONOCYTES # BLD AUTO: 0.69 K/UL (ref 0–0.8)
MONOCYTES NFR BLD AUTO: 7.4 % (ref 2–6)
MPC BLD CALC-MCNC: 9.6 FL (ref 9.4–12.4)
NEUTROPHILS # BLD AUTO: 6.33 K/UL (ref 1.8–7.7)
NEUTROPHILS NFR BLD AUTO: 67.9 % (ref 53–65)
NITRITE, POC UA: POSITIVE
NONHDLC SERPL-MCNC: 119 MG/DL
NRBC # BLD AUTO: 0 X10E3/UL
NRBC, AUTO (.00): 0 %
OVALOCYTES BLD QL SMEAR: ABNORMAL
PH, POC UA: 6
PLATELET # BLD AUTO: 295 K/UL (ref 150–400)
PLATELET MORPHOLOGY: ABNORMAL
POLYCHROMASIA BLD QL SMEAR: ABNORMAL
POTASSIUM SERPL-SCNC: 3.2 MMOL/L (ref 3.5–5.1)
PROT SERPL-MCNC: 7.5 G/DL (ref 6.4–8.2)
PROTEIN, POC: ABNORMAL
RBC # BLD AUTO: 4.81 M/UL (ref 4.2–5.4)
SODIUM SERPL-SCNC: 142 MMOL/L (ref 136–145)
SPECIFIC GRAVITY, POC UA: 1.01
TRIGL SERPL-MCNC: 107 MG/DL (ref 35–150)
TSH SERPL DL<=0.005 MIU/L-ACNC: 2.05 UIU/ML (ref 0.36–3.74)
UROBILINOGEN, POC UA: 0.2
VLDLC SERPL-MCNC: 21 MG/DL
WBC # BLD AUTO: 9.31 K/UL (ref 4.5–11)

## 2024-02-06 PROCEDURE — 1159F MED LIST DOCD IN RCRD: CPT | Mod: CPTII,,, | Performed by: NURSE PRACTITIONER

## 2024-02-06 PROCEDURE — 3078F DIAST BP <80 MM HG: CPT | Mod: CPTII,,, | Performed by: NURSE PRACTITIONER

## 2024-02-06 PROCEDURE — 80050 GENERAL HEALTH PANEL: CPT | Mod: ,,, | Performed by: CLINICAL MEDICAL LABORATORY

## 2024-02-06 PROCEDURE — 83036 HEMOGLOBIN GLYCOSYLATED A1C: CPT | Mod: ,,, | Performed by: CLINICAL MEDICAL LABORATORY

## 2024-02-06 PROCEDURE — 87086 URINE CULTURE/COLONY COUNT: CPT | Mod: ,,, | Performed by: CLINICAL MEDICAL LABORATORY

## 2024-02-06 PROCEDURE — 3008F BODY MASS INDEX DOCD: CPT | Mod: CPTII,,, | Performed by: NURSE PRACTITIONER

## 2024-02-06 PROCEDURE — 81003 URINALYSIS AUTO W/O SCOPE: CPT | Mod: QW,,, | Performed by: NURSE PRACTITIONER

## 2024-02-06 PROCEDURE — 99204 OFFICE O/P NEW MOD 45 MIN: CPT | Mod: ,,, | Performed by: NURSE PRACTITIONER

## 2024-02-06 PROCEDURE — 3074F SYST BP LT 130 MM HG: CPT | Mod: CPTII,,, | Performed by: NURSE PRACTITIONER

## 2024-02-06 PROCEDURE — 1160F RVW MEDS BY RX/DR IN RCRD: CPT | Mod: CPTII,,, | Performed by: NURSE PRACTITIONER

## 2024-02-06 PROCEDURE — 80061 LIPID PANEL: CPT | Mod: GZ,,, | Performed by: CLINICAL MEDICAL LABORATORY

## 2024-02-06 RX ORDER — POTASSIUM CHLORIDE 20 MEQ/1
20 TABLET, EXTENDED RELEASE ORAL 2 TIMES DAILY
Qty: 60 TABLET | Refills: 5 | Status: SHIPPED | OUTPATIENT
Start: 2024-02-06

## 2024-02-06 RX ORDER — NITROFURANTOIN 25; 75 MG/1; MG/1
100 CAPSULE ORAL 2 TIMES DAILY
Qty: 10 CAPSULE | Refills: 0 | Status: SHIPPED | OUTPATIENT
Start: 2024-02-06 | End: 2024-02-11

## 2024-02-06 RX ORDER — METOCLOPRAMIDE HYDROCHLORIDE 5 MG/1
10 TABLET, ORALLY DISINTEGRATING ORAL 2 TIMES DAILY
Qty: 60 TABLET | Refills: 5 | Status: SHIPPED | OUTPATIENT
Start: 2024-02-06

## 2024-02-06 NOTE — ASSESSMENT & PLAN NOTE
Nausea  is controlled. Current medical regimen is effective;   Will adjust according to results and monitor.

## 2024-02-06 NOTE — PROGRESS NOTES
JULISSA Harp   [facility]  [address]  [place] [address]     PATIENT NAME: Zahra Gandara  : 1970  DATE: 24  MRN: [id number]      Billing Provider: JULISSA Harp  Level of Service:   Patient PCP Information       Provider PCP Type    Albertina Silvestre DO General            Reason for Visit / Chief Complaint: Establish Care (Zahra Lomax 53 year old female presents to establish care.), Hypothyroidism, Sarcoidosis (Affects her skin and her lings causing shortness of breath), Diabetes (Reports glucose levels have been high at times due to the use of steroids for other condition), and Health Maintenance ( Hepatitis C Screening/Cervical Cancer Screening (HPV/Cotest - Preferred)/Lipid Panel/HIV Screening/6 more care gaps/ Patient requested speaking to the provider concerning her care gaps)   Health Maintenance Due   Topic Date Due    Hepatitis C Screening  Never done    Cervical Cancer Screening  Never done    Lipid Panel  Never done    HIV Screening  Never done    TETANUS VACCINE  Never done    Mammogram  Never done    Colorectal Cancer Screening  Never done    Shingles Vaccine (1 of 2) Never done    Influenza Vaccine (1) Never done    COVID-19 Vaccine (3 - 2023-24 season) 2023          History of Present Illness / Problem Focused Workflow     Zahra Gandara presents to the clinic to establish care. Pt has PMH of Sarcoidosis, DM, Hypothyroidism. Pt states her sarcoidosis affects more of her lungs and skin. She is seeing Pulmonologist and Dermatologist for this. She has not had labs in quite some time as her previous provider, Dr. Silvestre has been on maternity leave. [facility] Pt vu. She states she has been diagnosed as diabetic but is not currently taking any medication for this. She states the reason her sugars were high was due to she had been on steroids but states last time her A1C was checked it was good. She is currently on  "steroids now for her Sarcoidosis. She does need a refill on her Reglan. She also feels like she may have a UTI due to foul odor to urine and burning with urination. She states she will see kidney specialist Thursday for her first visit due to she states she has CKD stage 3. Pt has swelling to both legs she wears compression stockings for. She does have lasix to take as needed. She states she recently received leg pumps she is going to start doing at her house. She is not fasting but states she has only had some fruit this AM. She denies any other needs at this time. NAD noted.     No results found for: "HGBA1C"     CMP  Sodium   Date Value Ref Range Status   08/15/2023 138 136 - 145 mmol/L Final     Potassium   Date Value Ref Range Status   08/15/2023 4.3 3.5 - 5.1 mmol/L Final     Chloride   Date Value Ref Range Status   08/15/2023 107 98 - 107 mmol/L Final     CO2   Date Value Ref Range Status   08/15/2023 24 21 - 32 mmol/L Final     Glucose   Date Value Ref Range Status   08/15/2023 99 74 - 106 mg/dL Final     BUN   Date Value Ref Range Status   08/15/2023 16 7 - 18 mg/dL Final     Creatinine   Date Value Ref Range Status   08/15/2023 2.05 (H) 0.55 - 1.02 mg/dL Final     Calcium   Date Value Ref Range Status   08/15/2023 10.3 (H) 8.5 - 10.1 mg/dL Final     Total Protein   Date Value Ref Range Status   08/15/2023 8.5 (H) 6.4 - 8.2 g/dL Final     Albumin   Date Value Ref Range Status   08/15/2023 3.5 3.5 - 5.0 g/dL Final     Bilirubin, Total   Date Value Ref Range Status   08/15/2023 0.5 >0.0 - 1.2 mg/dL Final     Alk Phos   Date Value Ref Range Status   08/15/2023 78 41 - 108 U/L Final     AST   Date Value Ref Range Status   08/15/2023 20 15 - 37 U/L Final     ALT   Date Value Ref Range Status   08/15/2023 13 13 - 56 U/L Final     Anion Gap   Date Value Ref Range Status   08/15/2023 11 7 - 16 mmol/L Final     eGFR   Date Value Ref Range Status   08/15/2023 29 (L) >=60 mL/min/1.73m2 Final        Lab Results " "  Component Value Date    WBC 6.59 08/15/2023    RBC 5.59 (H) 08/15/2023    HGB 12.1 08/15/2023    HCT 38.6 08/15/2023    MCV 69.1 (L) 08/15/2023    MCH 21.6 (L) 08/15/2023    MCHC 31.3 (L) 08/15/2023    RDW 17.3 (H) 08/15/2023     08/15/2023    MPV 9.5 08/15/2023    LYMPH 17.0 (L) 08/15/2023    LYMPH 1.12 08/15/2023    MONO 3.2 08/15/2023    EOS 0.06 08/15/2023    BASO 0.10 08/15/2023    EOSINOPHIL 0.9 (L) 08/15/2023    BASOPHIL 1.5 (H) 08/15/2023        No results found for: "CHOL"  No results found for: "HDL"  No results found for: "LDLCALC"  No results found for: "TRIG"  No results found for: "CHOLHDL"     Wt Readings from Last 3 Encounters:   02/06/24 0909 127.6 kg (281 lb 3.2 oz)   11/21/23 0954 126.1 kg (278 lb)   11/08/23 1414 126.1 kg (278 lb)        BP Readings from Last 3 Encounters:   02/06/24 117/74   11/21/23 117/70   11/08/23 (!) 104/50        Review of Systems     Review of Systems   Constitutional: Negative.    Eyes: Negative.    Respiratory: Negative.     Cardiovascular:  Positive for leg swelling.   Gastrointestinal: Negative.    Endocrine: Negative.    Genitourinary:  Positive for dysuria.        Foul odor to urine   Allergic/Immunologic: Negative.    Hematological: Negative.    Psychiatric/Behavioral: Negative.          Medical / Social / Family History     Past Medical History:   Diagnosis Date    Collapse, lung     Diabetes mellitus, type 2     with Gastroparesis    Edema     Gastroparesis     Low back pain     Sarcoidosis     Lung and Cutaneous Sarcoid    Thyroid disease     Hypothyroid       Past Surgical History:   Procedure Laterality Date    broken ankle repair      CHEST TUBE INSERTION         Social History  Ms.  reports that she has never smoked. She has never been exposed to tobacco smoke. She has never used smokeless tobacco. She reports current alcohol use. She reports that she does not use drugs.    Family History  Ms.'s family history includes Diabetes in her mother, " "sister, and sister; Hypertension in her father and mother; No Known Problems in her brother and brother.    Medications and Allergies     Medications  Outpatient Medications Marked as Taking for the 2/6/24 encounter (Office Visit) with Lilly Haney FNP   Medication Sig Dispense Refill    furosemide (LASIX) 20 MG tablet Take 20 mg by mouth daily as needed.      halobetasol (ULTRAVATE) 0.05 % cream Apply topically 2 (two) times daily. 50 g 5    hydrocortisone 2.5 % cream Apply topically 2 (two) times daily. For the face 28 g 5    hydroxychloroquine (PLAQUENIL) 200 mg tablet Take 1 tablet (200 mg total) by mouth once daily. 90 tablet 2    levothyroxine (SYNTHROID) 50 MCG tablet Take 50 mcg by mouth.      predniSONE (DELTASONE) 20 MG tablet Take 1 tablet (20 mg total) by mouth once daily. 30 tablet 5    [DISCONTINUED] chlorhexidine (PERIDEX) 0.12 % solution TAKE AS DIRECTED      [DISCONTINUED] metoclopramide HCl 5 mg TbDL Take 10 mg by mouth 4 (four) times daily.      [DISCONTINUED] potassium chloride SA (K-DUR,KLOR-CON) 20 MEQ tablet Take 20 mEq by mouth 2 (two) times daily as needed.      [DISCONTINUED] triamcinolone acetonide 0.025% (KENALOG) 0.025 % Oint Apply topically 2 (two) times daily. For the face 454 g 5       Allergies  Review of patient's allergies indicates:   Allergen Reactions    Bactrim [sulfamethoxazole-trimethoprim] Hives    Pcn [penicillins] Hives       Physical Examination     Vitals:    02/06/24 0909   BP: 117/74   BP Location: Left arm   Patient Position: Sitting   BP Method: Large (Manual)   Pulse: 62   Resp: (!) 21   Temp: 98 °F (36.7 °C)   TempSrc: Oral   SpO2: 99%   Weight: 127.6 kg (281 lb 3.2 oz)   Height: 5' 4" (1.626 m)      Physical Exam  Constitutional:       Appearance: Normal appearance. She is obese.   HENT:      Head: Normocephalic.   Eyes:      Extraocular Movements: Extraocular movements intact.   Cardiovascular:      Rate and Rhythm: Normal rate and regular rhythm.      Pulses: " Normal pulses.      Heart sounds: Normal heart sounds.   Pulmonary:      Effort: Pulmonary effort is normal.      Breath sounds: Normal breath sounds.   Abdominal:      General: Abdomen is flat. Bowel sounds are normal.      Palpations: Abdomen is soft.   Musculoskeletal:      Right lower leg: Edema present.      Left lower leg: Edema present.   Skin:     General: Skin is warm and dry.      Capillary Refill: Capillary refill takes less than 2 seconds.      Comments: Pt has hyperpigmentation of skin   Neurological:      General: No focal deficit present.      Mental Status: She is alert and oriented to person, place, and time.   Psychiatric:         Mood and Affect: Mood normal.         Behavior: Behavior normal.          Assessment and Plan (including Health Maintenance)   :    Plan:     There are no Patient Instructions on file for this visit.       Health Maintenance Due   Topic Date Due    Hepatitis C Screening  Never done    Cervical Cancer Screening  Never done    Lipid Panel  Never done    HIV Screening  Never done    TETANUS VACCINE  Never done    Mammogram  Never done    Colorectal Cancer Screening  Never done    Shingles Vaccine (1 of 2) Never done    Influenza Vaccine (1) Never done    COVID-19 Vaccine (3 - 2023-24 season) 09/01/2023       Problem List Items Addressed This Visit       Dysuria    Current Assessment & Plan     See above plan.   UA positive for UTI.          Relevant Orders    POCT URINALYSIS W/O SCOPE (Completed)    Hypokalemia    Current Assessment & Plan     Labs pending. Will inform of results when available.          Relevant Medications    potassium chloride SA (K-DUR,KLOR-CON) 20 MEQ tablet    Hypothyroidism    Current Assessment & Plan     Labs pending. Will inform of results when available.             Relevant Orders    TSH    Nausea    Current Assessment & Plan     Nausea  is controlled. Current medical regimen is effective;   Will adjust according to results and monitor.           Relevant Medications    metoclopramide HCl 5 mg TbDL    Screening for lipid disorders    Current Assessment & Plan     Labs pending. Will inform of results when available.            Relevant Orders    Lipid Panel    Type 2 diabetes mellitus with stage 3a chronic kidney disease, without long-term current use of insulin    Current Assessment & Plan     Labs pending. Will inform of results when available.             Relevant Orders    Comprehensive Metabolic Panel    CBC Auto Differential    Hemoglobin A1C    Urinary tract infection without hematuria - Primary    Current Assessment & Plan     UA positive for UTI.   Macrobid prescribed to take as directed. Instructed to take all abx until gone even if start to feel better.    Urine culture pending. Will inform of results when available.   Instructed to RTC for any new/worsening/persisting ssx.          Component 10:20   Color, UA Yellow   Spec Grav UA 1.010   pH, UA 6.0   WBC, UA small   Nitrite, UA positive   Protein, POC neg   Glucose, UA neg   Ketones, UA neg   Bilirubin, POC neg   Urobilinogen, UA 0.2   Blood, UA neg             Relevant Medications    nitrofurantoin, macrocrystal-monohydrate, (MACROBID) 100 MG capsule    Other Relevant Orders    Urine culture     Urinary tract infection without hematuria, site unspecified  -     nitrofurantoin, macrocrystal-monohydrate, (MACROBID) 100 MG capsule; Take 1 capsule (100 mg total) by mouth 2 (two) times daily. for 5 days  Dispense: 10 capsule; Refill: 0  -     Urine culture    Dysuria  -     POCT URINALYSIS W/O SCOPE    Hypothyroidism, unspecified type  -     TSH; Future; Expected date: 02/06/2024    Type 2 diabetes mellitus with stage 3a chronic kidney disease, without long-term current use of insulin  -     Comprehensive Metabolic Panel; Future; Expected date: 02/06/2024  -     CBC Auto Differential; Future; Expected date: 02/06/2024  -     Hemoglobin A1C; Future; Expected date: 02/06/2024    Nausea  -      metoclopramide HCl 5 mg TbDL; Take 10 mg by mouth 2 (two) times a day.  Dispense: 60 tablet; Refill: 5    Hypokalemia  -     potassium chloride SA (K-DUR,KLOR-CON) 20 MEQ tablet; Take 1 tablet (20 mEq total) by mouth 2 (two) times daily.  Dispense: 60 tablet; Refill: 5    Screening for lipid disorders  -     Lipid Panel; Future; Expected date: 02/06/2024       The patient has no Health Maintenance topics of status Not Due        Future Appointments   Date Time Provider Department Center   2/8/2024  1:00 PM Eva Enriquez DO [facility]   8/6/2024 10:00 AM Lilly Haney FNP [facility]        Follow up in about 6 months (around 8/6/2024), or if symptoms worsen or fail to improve.    Health Maintenance Due   Topic Date Due    Hepatitis C Screening  Never done    Cervical Cancer Screening  Never done    Lipid Panel  Never done    HIV Screening  Never done    TETANUS VACCINE  Never done    Mammogram  Never done    Colorectal Cancer Screening  Never done    Shingles Vaccine (1 of 2) Never done    Influenza Vaccine (1) Never done    COVID-19 Vaccine (3 - 2023-24 season) 09/01/2023        Signature:  JULISSA Harp    Date of encounter: 2/6/24

## 2024-02-06 NOTE — Clinical Note
Georgina mccrary can you please get her records from [facility] for me? Just last clinic visit with any labs would be good. Thanks!

## 2024-02-06 NOTE — ASSESSMENT & PLAN NOTE
UA positive for UTI.   Macrobid prescribed to take as directed. Instructed to take all abx until gone even if start to feel better.    Urine culture pending. Will inform of results when available.   Instructed to RTC for any new/worsening/persisting ssx.          Component 10:20   Color, UA Yellow   Spec Grav UA 1.010   pH, UA 6.0   WBC, UA small   Nitrite, UA positive   Protein, POC neg   Glucose, UA neg   Ketones, UA neg   Bilirubin, POC neg   Urobilinogen, UA 0.2   Blood, UA neg

## 2024-02-07 ENCOUNTER — PATIENT OUTREACH (OUTPATIENT)
Dept: ADMINISTRATIVE | Facility: HOSPITAL | Age: 54
End: 2024-02-07

## 2024-02-07 LAB
EST. AVERAGE GLUCOSE BLD GHB EST-MCNC: 128 MG/DL
HBA1C MFR BLD HPLC: 6.1 % (ref 4.5–6.6)

## 2024-02-07 NOTE — PROGRESS NOTES
Please let pt know her labs are stable. Her A1C is really good at 6.1 and her thyroid level is normal. Her urine culture is still pending so we will let her know about that when it comes back. Thanks!

## 2024-02-07 NOTE — LETTER
AUTHORIZATION FOR RELEASE OF   CONFIDENTIAL INFORMATION    Dear [facility] ,    We are seeing Zahra Gandara, date of birth 1970, in the clinic at [facility]. Lilly Haney FNP is the patient's PCP. Zahra Gandara has an outstanding lab/procedure at the time we reviewed her chart. In order to help keep her health information updated, she has authorized us to request the following medical record(s):        (  )  MAMMOGRAM                                      (  )  COLONOSCOPY      (  )  PAP SMEAR                                          (  )  OUTSIDE LAB RESULTS     (  )  DEXA SCAN                                          (  )  EYE EXAM            (  )  FOOT EXAM                                          (  )  ENTIRE RECORD     (  )  OUTSIDE IMMUNIZATIONS                 ( X )MOST RECENT VISITS LABS TESTS ETC         Please fax records to [facility], [place], Sierra, FNP, [phone]     If you have any questions, please contact Anup Cruz at [phone].           Patient Name: Zahra Gandara  : 1970  Patient Phone #: [phone]

## 2024-02-07 NOTE — PROGRESS NOTES
Health maintenance record review for population health care gaps    BRIAN sent to [facility] for recent records and labs per pcp

## 2024-02-08 ENCOUNTER — OFFICE VISIT (OUTPATIENT)
Dept: NEPHROLOGY | Facility: CLINIC | Age: 54
End: 2024-02-08
Payer: COMMERCIAL

## 2024-02-08 VITALS
HEIGHT: 64 IN | DIASTOLIC BLOOD PRESSURE: 74 MMHG | OXYGEN SATURATION: 98 % | RESPIRATION RATE: 18 BRPM | WEIGHT: 287 LBS | SYSTOLIC BLOOD PRESSURE: 142 MMHG | HEART RATE: 74 BPM | BODY MASS INDEX: 49 KG/M2

## 2024-02-08 DIAGNOSIS — N18.31 STAGE 3A CHRONIC KIDNEY DISEASE: Primary | ICD-10-CM

## 2024-02-08 DIAGNOSIS — Z79.1 NSAID LONG-TERM USE: ICD-10-CM

## 2024-02-08 DIAGNOSIS — E08.21 DIABETIC NEPHROPATHY ASSOCIATED WITH DIABETES MELLITUS DUE TO UNDERLYING CONDITION: ICD-10-CM

## 2024-02-08 LAB — UA COMPLETE W REFLEX CULTURE PNL UR: NORMAL

## 2024-02-08 PROCEDURE — 3066F NEPHROPATHY DOC TX: CPT | Mod: CPTII,,, | Performed by: INTERNAL MEDICINE

## 2024-02-08 PROCEDURE — 3078F DIAST BP <80 MM HG: CPT | Mod: CPTII,,, | Performed by: INTERNAL MEDICINE

## 2024-02-08 PROCEDURE — 99204 OFFICE O/P NEW MOD 45 MIN: CPT | Mod: S$PBB,,, | Performed by: INTERNAL MEDICINE

## 2024-02-08 PROCEDURE — 3077F SYST BP >= 140 MM HG: CPT | Mod: CPTII,,, | Performed by: INTERNAL MEDICINE

## 2024-02-08 PROCEDURE — 3044F HG A1C LEVEL LT 7.0%: CPT | Mod: CPTII,,, | Performed by: INTERNAL MEDICINE

## 2024-02-08 PROCEDURE — 99214 OFFICE O/P EST MOD 30 MIN: CPT | Mod: PBBFAC | Performed by: INTERNAL MEDICINE

## 2024-02-08 PROCEDURE — 3008F BODY MASS INDEX DOCD: CPT | Mod: CPTII,,, | Performed by: INTERNAL MEDICINE

## 2024-02-08 PROCEDURE — 1159F MED LIST DOCD IN RCRD: CPT | Mod: CPTII,,, | Performed by: INTERNAL MEDICINE

## 2024-02-19 ENCOUNTER — HOSPITAL ENCOUNTER (OUTPATIENT)
Dept: RADIOLOGY | Facility: HOSPITAL | Age: 54
Discharge: HOME | End: 2024-02-19
Attending: INTERNAL MEDICINE
Payer: COMMERCIAL

## 2024-02-19 DIAGNOSIS — N18.31 STAGE 3A CHRONIC KIDNEY DISEASE: ICD-10-CM

## 2024-02-19 PROCEDURE — 76775 US EXAM ABDO BACK WALL LIM: CPT | Mod: 26,,, | Performed by: STUDENT IN AN ORGANIZED HEALTH CARE EDUCATION/TRAINING PROGRAM

## 2024-02-19 PROCEDURE — 76775 US EXAM ABDO BACK WALL LIM: CPT | Mod: TC

## 2024-02-23 ENCOUNTER — OFFICE VISIT (OUTPATIENT)
Dept: PULMONOLOGY | Facility: CLINIC | Age: 54
End: 2024-02-23
Payer: COMMERCIAL

## 2024-02-23 ENCOUNTER — HOSPITAL ENCOUNTER (OUTPATIENT)
Dept: RADIOLOGY | Facility: HOSPITAL | Age: 54
Discharge: HOME | End: 2024-02-23
Attending: INTERNAL MEDICINE
Payer: COMMERCIAL

## 2024-02-23 VITALS
WEIGHT: 278 LBS | DIASTOLIC BLOOD PRESSURE: 56 MMHG | RESPIRATION RATE: 16 BRPM | BODY MASS INDEX: 47.46 KG/M2 | HEART RATE: 81 BPM | SYSTOLIC BLOOD PRESSURE: 112 MMHG | OXYGEN SATURATION: 97 % | HEIGHT: 64 IN

## 2024-02-23 DIAGNOSIS — D86.9 SARCOID: ICD-10-CM

## 2024-02-23 DIAGNOSIS — D86.9 SARCOID: Primary | ICD-10-CM

## 2024-02-23 PROCEDURE — 3066F NEPHROPATHY DOC TX: CPT | Mod: CPTII,,, | Performed by: INTERNAL MEDICINE

## 2024-02-23 PROCEDURE — 71046 X-RAY EXAM CHEST 2 VIEWS: CPT | Mod: 26,,, | Performed by: RADIOLOGY

## 2024-02-23 PROCEDURE — 3044F HG A1C LEVEL LT 7.0%: CPT | Mod: CPTII,,, | Performed by: INTERNAL MEDICINE

## 2024-02-23 PROCEDURE — 99213 OFFICE O/P EST LOW 20 MIN: CPT | Mod: S$PBB,,, | Performed by: INTERNAL MEDICINE

## 2024-02-23 PROCEDURE — 1159F MED LIST DOCD IN RCRD: CPT | Mod: CPTII,,, | Performed by: INTERNAL MEDICINE

## 2024-02-23 PROCEDURE — 71046 X-RAY EXAM CHEST 2 VIEWS: CPT | Mod: TC

## 2024-02-23 PROCEDURE — 99214 OFFICE O/P EST MOD 30 MIN: CPT | Mod: PBBFAC,25 | Performed by: INTERNAL MEDICINE

## 2024-02-23 PROCEDURE — 3078F DIAST BP <80 MM HG: CPT | Mod: CPTII,,, | Performed by: INTERNAL MEDICINE

## 2024-02-23 PROCEDURE — 3074F SYST BP LT 130 MM HG: CPT | Mod: CPTII,,, | Performed by: INTERNAL MEDICINE

## 2024-02-23 PROCEDURE — 3008F BODY MASS INDEX DOCD: CPT | Mod: CPTII,,, | Performed by: INTERNAL MEDICINE

## 2024-02-23 NOTE — PROGRESS NOTES
Subjective:       Patient ID: Zahra Gandara is a 53 y.o. female.    Chief Complaint: No chief complaint on file.    Patient presents today for follow up shortness of breath and skin disease related to her sarcoid      Past Medical History:   Diagnosis Date    Collapse, lung     Diabetes mellitus, type 2     with Gastroparesis    Edema     Gastroparesis     Low back pain     Sarcoidosis     Lung and Cutaneous Sarcoid    Thyroid disease     Hypothyroid     Past Surgical History:   Procedure Laterality Date    broken ankle repair      CHEST TUBE INSERTION       Family History   Problem Relation Age of Onset    Diabetes Mother     Hypertension Mother     Hypertension Father     Diabetes Sister     Diabetes Sister         Pre-diabetes    No Known Problems Brother     No Known Problems Brother      Review of patient's allergies indicates:   Allergen Reactions    Bactrim [sulfamethoxazole-trimethoprim] Hives    Pcn [penicillins] Hives      Social History     Tobacco Use    Smoking status: Never     Passive exposure: Never    Smokeless tobacco: Never   Substance Use Topics    Alcohol use: Not Currently     Comment: rarely, with celebrations    Drug use: Never      Review of Systems   Constitutional:  Negative for chills, activity change and night sweats.   HENT:  Negative for congestion and ear pain.    Eyes:  Negative for redness and itching.   Cardiovascular:  Negative for chest pain and palpitations.   Musculoskeletal:  Negative for arthralgias and back pain.   Skin:  Negative for rash.   Gastrointestinal:  Negative for abdominal pain and abdominal distention.   Neurological:  Negative for dizziness and headaches.   Hematological:  Negative for adenopathy. Does not bruise/bleed easily.   Psychiatric/Behavioral:  Negative for confusion. The patient is not nervous/anxious.        Objective:      Physical Exam  Personal Diagnostic Review  none pertinent        2/23/2024     8:20 AM 2/8/2024     1:03 PM 2/6/2024     9:09  "AM 11/21/2023     9:54 AM 11/8/2023     2:14 PM 9/19/2023    11:24 AM 8/15/2023    11:00 AM   Pulmonary Function Tests   SpO2 97 % 98 % 99 %  98 %     Height 5' 4" (1.626 m) 5' 4" (1.626 m) 5' 4" (1.626 m)  5' 4" (1.626 m) 5' 4" (1.626 m) 5' 4" (1.626 m)   Weight 126.1 kg (278 lb) 130.2 kg (287 lb) 127.6 kg (281 lb 3.2 oz) 126.1 kg (278 lb) 126.1 kg (278 lb) 126.1 kg (278 lb) 126.2 kg (278 lb 3.2 oz)   BMI (Calculated) 47.7 49.2 48.2  47.7 47.7 47.7         Assessment:       1. Sarcoid        Outpatient Encounter Medications as of 2/23/2024   Medication Sig Dispense Refill    furosemide (LASIX) 20 MG tablet Take 20 mg by mouth daily as needed.      halobetasol (ULTRAVATE) 0.05 % cream Apply topically 2 (two) times daily. 50 g 5    hydrocortisone 2.5 % cream Apply topically 2 (two) times daily. For the face 28 g 5    hydroxychloroquine (PLAQUENIL) 200 mg tablet Take 1 tablet (200 mg total) by mouth once daily. 90 tablet 2    levothyroxine (SYNTHROID) 50 MCG tablet Take 50 mcg by mouth.      metoclopramide HCl 5 mg TbDL Take 10 mg by mouth 2 (two) times a day. 60 tablet 5    potassium chloride SA (K-DUR,KLOR-CON) 20 MEQ tablet Take 1 tablet (20 mEq total) by mouth 2 (two) times daily. 60 tablet 5    predniSONE (DELTASONE) 20 MG tablet Take 1 tablet (20 mg total) by mouth once daily. 30 tablet 5     No facility-administered encounter medications on file as of 2/23/2024.     No orders of the defined types were placed in this encounter.      Plan:       Problem List Items Addressed This Visit          Other    Sarcoid - Primary     120 mg every other day or skin is markedly better breathing is markedly better were going to switch her to 20 alternating with 10 every other day see her back in 3 months or chest x-ray is unchanged today                      "

## 2024-02-23 NOTE — ASSESSMENT & PLAN NOTE
120 mg every other day or skin is markedly better breathing is markedly better were going to switch her to 20 alternating with 10 every other day see her back in 3 months or chest x-ray is unchanged today

## 2024-02-26 ENCOUNTER — PATIENT OUTREACH (OUTPATIENT)
Dept: ADMINISTRATIVE | Facility: HOSPITAL | Age: 54
End: 2024-02-26

## 2024-02-26 NOTE — PROGRESS NOTES
Health maintenance record review for population health care gaps    Upload recent [facility] Record

## 2024-05-02 DIAGNOSIS — D86.9 SARCOID: ICD-10-CM

## 2024-05-02 RX ORDER — PREDNISONE 20 MG/1
20 TABLET ORAL DAILY
Qty: 90 TABLET | Refills: 2 | Status: SHIPPED | OUTPATIENT
Start: 2024-05-02 | End: 2025-01-27

## 2024-05-10 LAB
LEFT EYE DM RETINOPATHY: NEGATIVE
RIGHT EYE DM RETINOPATHY: NEGATIVE

## 2024-05-13 PROBLEM — N39.0 URINARY TRACT INFECTION WITHOUT HEMATURIA: Status: RESOLVED | Noted: 2024-02-06 | Resolved: 2024-05-13

## 2024-05-13 PROBLEM — Z13.220 SCREENING FOR LIPID DISORDERS: Status: RESOLVED | Noted: 2024-02-06 | Resolved: 2024-05-13

## 2024-05-24 ENCOUNTER — OFFICE VISIT (OUTPATIENT)
Dept: PULMONOLOGY | Facility: CLINIC | Age: 54
End: 2024-05-24
Payer: MEDICAID

## 2024-05-24 VITALS
OXYGEN SATURATION: 98 % | RESPIRATION RATE: 20 BRPM | BODY MASS INDEX: 47.46 KG/M2 | WEIGHT: 278 LBS | SYSTOLIC BLOOD PRESSURE: 122 MMHG | HEIGHT: 64 IN | DIASTOLIC BLOOD PRESSURE: 58 MMHG | HEART RATE: 61 BPM

## 2024-05-24 DIAGNOSIS — D86.9 SARCOID: Primary | ICD-10-CM

## 2024-05-24 PROCEDURE — 3074F SYST BP LT 130 MM HG: CPT | Mod: CPTII,,, | Performed by: INTERNAL MEDICINE

## 2024-05-24 PROCEDURE — 3078F DIAST BP <80 MM HG: CPT | Mod: CPTII,,, | Performed by: INTERNAL MEDICINE

## 2024-05-24 PROCEDURE — 3044F HG A1C LEVEL LT 7.0%: CPT | Mod: CPTII,,, | Performed by: INTERNAL MEDICINE

## 2024-05-24 PROCEDURE — 3066F NEPHROPATHY DOC TX: CPT | Mod: CPTII,,, | Performed by: INTERNAL MEDICINE

## 2024-05-24 PROCEDURE — 1159F MED LIST DOCD IN RCRD: CPT | Mod: CPTII,,, | Performed by: INTERNAL MEDICINE

## 2024-05-24 PROCEDURE — 99213 OFFICE O/P EST LOW 20 MIN: CPT | Mod: S$PBB,,, | Performed by: INTERNAL MEDICINE

## 2024-05-24 PROCEDURE — 3008F BODY MASS INDEX DOCD: CPT | Mod: CPTII,,, | Performed by: INTERNAL MEDICINE

## 2024-05-24 PROCEDURE — 99214 OFFICE O/P EST MOD 30 MIN: CPT | Mod: PBBFAC | Performed by: INTERNAL MEDICINE

## 2024-05-24 RX ORDER — FLUTICASONE PROPIONATE AND SALMETEROL 250; 50 UG/1; UG/1
1 POWDER RESPIRATORY (INHALATION) 2 TIMES DAILY
Qty: 60 EACH | Refills: 5 | Status: SHIPPED | OUTPATIENT
Start: 2024-05-24 | End: 2025-05-24

## 2024-05-24 NOTE — PROGRESS NOTES
Subjective:       Patient ID: Zahra Gandara is a 53 y.o. female.    Chief Complaint: Sarcoidosis    Sarcoidosis  This is a chronic problem. The current episode started more than 1 year ago. The problem has been unchanged. Pertinent negatives include no abdominal pain, arthralgias, chest pain, chills, congestion, headaches or rash.     Past Medical History:   Diagnosis Date    Collapse, lung     Diabetes mellitus, type 2     with Gastroparesis    Edema     Gastroparesis     Low back pain     Sarcoidosis     Lung and Cutaneous Sarcoid    Thyroid disease     Hypothyroid     Past Surgical History:   Procedure Laterality Date    broken ankle repair      CHEST TUBE INSERTION       Family History   Problem Relation Name Age of Onset    Diabetes Mother      Hypertension Mother      Hypertension Father      Diabetes Sister      Diabetes Sister          Pre-diabetes    No Known Problems Brother      No Known Problems Brother       Review of patient's allergies indicates:   Allergen Reactions    Bactrim [sulfamethoxazole-trimethoprim] Hives    Pcn [penicillins] Hives      Social History     Tobacco Use    Smoking status: Never     Passive exposure: Never    Smokeless tobacco: Never   Substance Use Topics    Alcohol use: Not Currently     Comment: rarely, with celebrations    Drug use: Never      Review of Systems   Constitutional:  Negative for chills, activity change and night sweats.   HENT:  Negative for congestion and ear pain.    Eyes:  Negative for redness and itching.   Cardiovascular:  Negative for chest pain and palpitations.   Musculoskeletal:  Negative for arthralgias and back pain.   Skin:  Negative for rash.   Gastrointestinal:  Negative for abdominal pain and abdominal distention.   Neurological:  Negative for dizziness and headaches.   Hematological:  Negative for adenopathy. Does not bruise/bleed easily.   Psychiatric/Behavioral:  Negative for confusion. The patient is not nervous/anxious.        Objective:  "     Physical Exam   Constitutional: She is oriented to person, place, and time. She appears well-developed and well-nourished.   HENT:   Head: Normocephalic.   Nose: Nose normal.   Mouth/Throat: Oropharynx is clear and moist.   Neck: No JVD present. No thyromegaly present.   Cardiovascular: Normal rate, regular rhythm, normal heart sounds and intact distal pulses.   Pulmonary/Chest: Normal expansion, hyperinflation, symmetric chest wall expansion, effort normal and breath sounds normal.   Abdominal: Soft. Bowel sounds are normal.   Musculoskeletal:         General: Normal range of motion.      Cervical back: Normal range of motion and neck supple.   Lymphadenopathy: No supraclavicular adenopathy is present.     She has no cervical adenopathy.   Neurological: She is alert and oriented to person, place, and time. She has normal reflexes.   Skin: Skin is warm and dry.   Psychiatric: She has a normal mood and affect. Her behavior is normal.     Personal Diagnostic Review  none pertinent        5/24/2024    10:02 AM 2/23/2024     8:20 AM 2/8/2024     1:03 PM 2/6/2024     9:09 AM 11/21/2023     9:54 AM 11/8/2023     2:14 PM 9/19/2023    11:24 AM   Pulmonary Function Tests   SpO2 98 % 97 % 98 % 99 %  98 %    Height 5' 4" (1.626 m) 5' 4" (1.626 m) 5' 4" (1.626 m) 5' 4" (1.626 m)  5' 4" (1.626 m) 5' 4" (1.626 m)   Weight 126.1 kg (278 lb) 126.1 kg (278 lb) 130.2 kg (287 lb) 127.6 kg (281 lb 3.2 oz) 126.1 kg (278 lb) 126.1 kg (278 lb) 126.1 kg (278 lb)   BMI (Calculated) 47.7 47.7 49.2 48.2  47.7 47.7         Assessment:       1. Sarcoid        Outpatient Encounter Medications as of 5/24/2024   Medication Sig Dispense Refill    furosemide (LASIX) 20 MG tablet Take 20 mg by mouth daily as needed.      halobetasol (ULTRAVATE) 0.05 % cream Apply topically 2 (two) times daily. 50 g 5    hydrocortisone 2.5 % cream Apply topically 2 (two) times daily. For the face 28 g 5    hydroxychloroquine (PLAQUENIL) 200 mg tablet Take 1 " tablet (200 mg total) by mouth once daily. 90 tablet 2    levothyroxine (SYNTHROID) 50 MCG tablet Take 50 mcg by mouth.      metoclopramide HCl 5 mg TbDL Take 10 mg by mouth 2 (two) times a day. 60 tablet 5    potassium chloride SA (K-DUR,KLOR-CON) 20 MEQ tablet Take 1 tablet (20 mEq total) by mouth 2 (two) times daily. 60 tablet 5    predniSONE (DELTASONE) 20 MG tablet Take 1 tablet (20 mg total) by mouth once daily. Take 1 tablet every even day and take 1/2 a tablet every odd day. 90 tablet 2    fluticasone-salmeterol diskus inhaler 250-50 mcg Inhale 1 puff into the lungs 2 (two) times daily. Controller 60 each 5    [DISCONTINUED] predniSONE (DELTASONE) 20 MG tablet Take 1 tablet (20 mg total) by mouth once daily. 30 tablet 5     No facility-administered encounter medications on file as of 5/24/2024.     No orders of the defined types were placed in this encounter.      Plan:       Problem List Items Addressed This Visit          Other    Sarcoid - Primary     Patient's skin in lung disease is controlled on 20/10  alternating dosesof prednisone.  Patient is currently without complaints doing much better will see back in 3 months and hopefully that time we go to 15 /10 on prednisone

## 2024-05-24 NOTE — ASSESSMENT & PLAN NOTE
Patient's skin in lung disease is controlled on 20/10  alternating dosesof prednisone.  Patient is currently without complaints doing much better will see back in 3 months and hopefully that time we go to 15 /10 on prednisone

## 2024-06-11 ENCOUNTER — OFFICE VISIT (OUTPATIENT)
Dept: NEPHROLOGY | Facility: CLINIC | Age: 54
End: 2024-06-11
Payer: MEDICAID

## 2024-06-11 VITALS
HEART RATE: 66 BPM | RESPIRATION RATE: 18 BRPM | SYSTOLIC BLOOD PRESSURE: 126 MMHG | WEIGHT: 292.38 LBS | HEIGHT: 64 IN | OXYGEN SATURATION: 100 % | DIASTOLIC BLOOD PRESSURE: 72 MMHG | BODY MASS INDEX: 49.92 KG/M2

## 2024-06-11 DIAGNOSIS — I10 ESSENTIAL HYPERTENSION: ICD-10-CM

## 2024-06-11 DIAGNOSIS — N18.31 STAGE 3A CHRONIC KIDNEY DISEASE: Primary | ICD-10-CM

## 2024-06-11 DIAGNOSIS — I12.9 HYPERTENSIVE NEPHROSCLEROSIS, STAGE 1 THROUGH STAGE 4 OR UNSPECIFIED CHRONIC KIDNEY DISEASE: ICD-10-CM

## 2024-06-11 PROCEDURE — 99215 OFFICE O/P EST HI 40 MIN: CPT | Mod: PBBFAC | Performed by: INTERNAL MEDICINE

## 2024-06-11 PROCEDURE — 99214 OFFICE O/P EST MOD 30 MIN: CPT | Mod: S$PBB,,, | Performed by: INTERNAL MEDICINE

## 2024-06-11 PROCEDURE — 1159F MED LIST DOCD IN RCRD: CPT | Mod: CPTII,,, | Performed by: INTERNAL MEDICINE

## 2024-06-11 PROCEDURE — 3066F NEPHROPATHY DOC TX: CPT | Mod: CPTII,,, | Performed by: INTERNAL MEDICINE

## 2024-06-11 PROCEDURE — 3008F BODY MASS INDEX DOCD: CPT | Mod: CPTII,,, | Performed by: INTERNAL MEDICINE

## 2024-06-11 PROCEDURE — 3074F SYST BP LT 130 MM HG: CPT | Mod: CPTII,,, | Performed by: INTERNAL MEDICINE

## 2024-06-11 PROCEDURE — 3044F HG A1C LEVEL LT 7.0%: CPT | Mod: CPTII,,, | Performed by: INTERNAL MEDICINE

## 2024-06-11 PROCEDURE — 3078F DIAST BP <80 MM HG: CPT | Mod: CPTII,,, | Performed by: INTERNAL MEDICINE

## 2024-06-11 RX ORDER — DAPAGLIFLOZIN 10 MG/1
10 TABLET, FILM COATED ORAL DAILY
Qty: 90 TABLET | Refills: 3 | Status: SHIPPED | OUTPATIENT
Start: 2024-06-11 | End: 2025-06-11

## 2024-06-11 NOTE — PROGRESS NOTES
[facility] History and Physical  Patient Name: Zahra Gandara  MRN: [id number]  Age: 53 y.o.  : 1970    Date: 2024 1:02 PM    Chief Complaint: Establish Care    HPI :   Ms Gandara presents to Nephrology clinic to establish care. She is followed by Dr. Albertina Silvestre MD as her primary care provider.     DM2: diagnosed years ago after taking steroids. Current regimen includes none. Last A1C 6.1%    Sarcoidosis:  diagnosed about 5 years ago. Diagnosed by Pulmonology Dr Gimenez. She follows with Dr. Benz/Dr. Cortes with Dermatology. She is on plaquenil. She is taking higher dose of steroids due to skin exacerbation     Primary lymphedema: sees Dr. Lizama. Using compression hose.      Nephrology history: No FH of kidney disease, no nephrolithiasis, or recurrent UTIs. No OTC medications including NSAIDs. She used to take ibuprofen 500 mg tablets as needed for pain. Avoiding NSAIDs.     Patient denies any CP, SOB, peripheral edema, dysuria, hematuria, changes in urinary habits, or increased frequency of urination.     Past Medical History:  has a past medical history of Collapse, lung, Diabetes mellitus, type 2, Edema, Gastroparesis, Low back pain, Sarcoidosis, and Thyroid disease.     Past Surgical History:   has a past surgical history that includes Chest tube insertion and broken ankle repair.     Family History:  family history includes Diabetes in her mother, sister, and sister; Hypertension in her father and mother; No Known Problems in her brother and brother. No family history of kidney disease.     Social History:   reports that she has never smoked. She has never been exposed to tobacco smoke. She has never used smokeless tobacco. She reports that she does not currently use alcohol. She reports that she does not use drugs.     Allergies: is allergic to bactrim [sulfamethoxazole-trimethoprim] and pcn [penicillins].     Medications: Reviewed including  OTC medications, herbal supplements, and NSAIDS.     Old records have been reviewed.      Review of Systems:  ROS: A 10 point ROS was completed and found to be negative except for that mentioned above.          Physical Exam:  Vitals:    06/11/24 1330   BP: 126/72   Pulse: 66   Resp: 18       Constitutional: sitting in chair, in NAD  Eyes: EOMI, white sclera  ENMT: moist mucus membranes, nares patent  Cardiovascular: normal rate, S1/S2 noted, no edema  Respiratory: symmetrical chest expansion, CTA-B  Gastrointestinal: +BS, soft, NT/ND  Musculoskeletal: normal, no joint erythema/effusions  Skin: no rash, no purpura, warm extremities  Neurological: Alert and Oriented x 4, afocal    Labs:   Lab Results   Component Value Date     02/06/2024    K 3.2 (L) 02/06/2024    CREATININE 1.40 (H) 02/06/2024    ALT 11 (L) 02/06/2024    AST 9 (L) 02/06/2024    HGBA1C 6.1 02/06/2024    LDLCALC 98 02/06/2024    CHOL 156 02/06/2024    HDL 37 (L) 02/06/2024    TRIG 107 02/06/2024    TSH 2.050 02/06/2024    WBC 9.31 02/06/2024    HGB 10.8 (L) 02/06/2024    HCT 34.8 (L) 02/06/2024     02/06/2024        Otherwise Reviewed    Assessment/Plan:       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 Her renal function really started to decline in 2022 with her prior fracture/surgery due to NSAIDs. She stopped NSAIDs.  Also a degree of diabetic nephropathy. Baseline sCr 1.4, today sCr pending. Counseled to avoid nephrotoxic agents such as NSAIDs.    Sarcoidosis: has lung involvement, skin involvement. Will refer to Rheumatology.     Proteinuria: urine Prot:Creat ratio is pending.     BMD: Calcium and Phosphorus PTH, Vit D next visit    RTC 4 months with CBC, RFP, UA, urine for Prot:creat ratio, PTH, Vit D      Alisha S. Parker, DO [facility]   06/11/2024

## 2024-06-17 DIAGNOSIS — E55.9 VITAMIN D DEFICIENCY, UNSPECIFIED: Primary | ICD-10-CM

## 2024-06-17 RX ORDER — ERGOCALCIFEROL 1.25 MG/1
50000 CAPSULE ORAL
Qty: 12 CAPSULE | Refills: 0 | Status: SHIPPED | OUTPATIENT
Start: 2024-06-17 | End: 2024-09-03

## 2024-06-17 NOTE — TELEPHONE ENCOUNTER
----- Message from Eva Enriquez DO sent at 6/17/2024  9:41 AM CDT -----  Renal function improved from last two checks. She has no protein in her urine. She is very vit d deficient. Please send ergo. ty

## 2024-06-18 ENCOUNTER — OFFICE VISIT (OUTPATIENT)
Dept: DERMATOLOGY | Facility: CLINIC | Age: 54
End: 2024-06-18
Payer: MEDICAID

## 2024-06-18 DIAGNOSIS — Z79.899 HIGH RISK MEDICATION USE: Primary | ICD-10-CM

## 2024-06-18 DIAGNOSIS — D86.9 SARCOIDOSIS: ICD-10-CM

## 2024-06-18 DIAGNOSIS — L81.0 POST-INFLAMMATORY HYPERPIGMENTATION: ICD-10-CM

## 2024-06-18 PROCEDURE — 3044F HG A1C LEVEL LT 7.0%: CPT | Mod: CPTII,,, | Performed by: STUDENT IN AN ORGANIZED HEALTH CARE EDUCATION/TRAINING PROGRAM

## 2024-06-18 PROCEDURE — 3066F NEPHROPATHY DOC TX: CPT | Mod: CPTII,,, | Performed by: STUDENT IN AN ORGANIZED HEALTH CARE EDUCATION/TRAINING PROGRAM

## 2024-06-18 PROCEDURE — 99214 OFFICE O/P EST MOD 30 MIN: CPT | Mod: ,,, | Performed by: STUDENT IN AN ORGANIZED HEALTH CARE EDUCATION/TRAINING PROGRAM

## 2024-06-18 PROCEDURE — 3061F NEG MICROALBUMINURIA REV: CPT | Mod: CPTII,,, | Performed by: STUDENT IN AN ORGANIZED HEALTH CARE EDUCATION/TRAINING PROGRAM

## 2024-06-18 PROCEDURE — 1159F MED LIST DOCD IN RCRD: CPT | Mod: CPTII,,, | Performed by: STUDENT IN AN ORGANIZED HEALTH CARE EDUCATION/TRAINING PROGRAM

## 2024-06-18 RX ORDER — HYDROXYCHLOROQUINE SULFATE 200 MG/1
200 TABLET, FILM COATED ORAL 2 TIMES DAILY
Qty: 60 TABLET | Refills: 5 | Status: SHIPPED | OUTPATIENT
Start: 2024-06-18

## 2024-06-18 NOTE — PROGRESS NOTES
[facility]  Payam Cortes MD    [address] [place] [address]  [phone]    Fax: [phone]    Patient Name: Zahra Gandara  Medical Record Number: [id number]  PCP: Lilly Haney FNP  Age: 53 y.o. : 1970  Contact: [phone] (home)     History of Present Illness:     Zahra Gandara is a 53 y.o.  female here for follow up of sarcoidosis. Treatment plan includes plaquenil 200 mg daily, clobetasol ointment for body, and hydrocortisone 2.5% to face. She is also taking prednisone 20 mg 1 tablet every even day and 1/2 tablet every odd day from Dr. Paez, which has improved her sarcoidosis. She has also had a follow up with Dr. Gimenez and baseline ophthalmology eval([facility]) besides cataracts baseline eye exam is well. She has DM2 and stage 3 CKD. 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     The patient has no other concerns today.    Review of Systems:     Unremarkable other than mentioned above.     Physical Exam:     General: Relaxed, oriented, alert    Skin examination of the scalp, face, neck, chest, back, abdomen, upper extremities and lower extremities were normal except for as listed below      Assessment and Plan:     1. Sarcoidosis  - Plaquenil 200 mg BID, in anticipation of tapering off prednisone   - clobetasol ointment for body only as needed to avoid atrophy   - hydrocortisone 2.5% face only as needed.   - prednisone 20 mg 1 tablet even days, and 1/2 tablet odd days  - continue seeing Dr. Gimenez(pulmonary 24), Dr. Enriquez (CKD, NSAID neuropathy), and Optho annual exams     2. High Risk Medication Monitoring : The risks and benefits of the medication were reviewed in full with the patient. Should any side effects occur, the patient will stop the medication and contact me immediately.    Plaquenil Counseling: I discussed with the patient that a baseline ophthalmologic exam is needed at the start of therapy and every year  thereafter  while on therapy. A CBC may also be warranted for monitoring. The side effects of this medication were discussed with the patient, including but  not limited to agranulocytosis, aplastic anemia, seizures, rashes, retinopathy, and liver toxicity. Patient instructed to call the office should any  adverse effect occur. The patient verbalized understanding of the proper use and possible adverse effects of Plaquenil. All the patient's questions  and concerns were addressed.    3.  Post inflammatory hyperpigmentation   - discussed this may take months to resolve, or may be permanent in some cases       Payam Cortes MD   Mohs Surgery/Dermatologic Oncology  Dermatology

## 2024-06-19 RX ORDER — HALOBETASOL PROPIONATE 0.5 MG/G
1 CREAM TOPICAL 2 TIMES DAILY
Qty: 60 G | Refills: 5 | Status: SHIPPED | OUTPATIENT
Start: 2024-06-19

## 2024-09-25 ENCOUNTER — OFFICE VISIT (OUTPATIENT)
Dept: NEPHROLOGY | Facility: CLINIC | Age: 54
End: 2024-09-25
Payer: MEDICAID

## 2024-09-25 VITALS
HEIGHT: 64 IN | BODY MASS INDEX: 49.17 KG/M2 | DIASTOLIC BLOOD PRESSURE: 66 MMHG | WEIGHT: 288 LBS | OXYGEN SATURATION: 98 % | SYSTOLIC BLOOD PRESSURE: 112 MMHG | RESPIRATION RATE: 18 BRPM | HEART RATE: 96 BPM

## 2024-09-25 DIAGNOSIS — N18.31 STAGE 3A CHRONIC KIDNEY DISEASE: Primary | ICD-10-CM

## 2024-09-25 PROCEDURE — 3066F NEPHROPATHY DOC TX: CPT | Mod: CPTII,,, | Performed by: NURSE PRACTITIONER

## 2024-09-25 PROCEDURE — 3044F HG A1C LEVEL LT 7.0%: CPT | Mod: CPTII,,, | Performed by: NURSE PRACTITIONER

## 2024-09-25 PROCEDURE — 3008F BODY MASS INDEX DOCD: CPT | Mod: CPTII,,, | Performed by: NURSE PRACTITIONER

## 2024-09-25 PROCEDURE — 1159F MED LIST DOCD IN RCRD: CPT | Mod: CPTII,,, | Performed by: NURSE PRACTITIONER

## 2024-09-25 PROCEDURE — 99213 OFFICE O/P EST LOW 20 MIN: CPT | Mod: S$PBB,,, | Performed by: NURSE PRACTITIONER

## 2024-09-25 PROCEDURE — 99999 PR PBB SHADOW E&M-EST. PATIENT-LVL IV: CPT | Mod: PBBFAC,,, | Performed by: NURSE PRACTITIONER

## 2024-09-25 PROCEDURE — 3061F NEG MICROALBUMINURIA REV: CPT | Mod: CPTII,,, | Performed by: NURSE PRACTITIONER

## 2024-09-25 PROCEDURE — 3074F SYST BP LT 130 MM HG: CPT | Mod: CPTII,,, | Performed by: NURSE PRACTITIONER

## 2024-09-25 PROCEDURE — 3078F DIAST BP <80 MM HG: CPT | Mod: CPTII,,, | Performed by: NURSE PRACTITIONER

## 2024-09-25 PROCEDURE — 99214 OFFICE O/P EST MOD 30 MIN: CPT | Mod: PBBFAC | Performed by: NURSE PRACTITIONER

## 2024-09-25 NOTE — PROGRESS NOTES
[facility] History and Physical  Patient Name: Zahra Gandara  MRN: [id number]  Age: 54 y.o.  : 1970    Date: 2024 1:02 PM    Chief Complaint: Follow Up    HPI :   Ms Gandara presents to Nephrology clinic for follow up. She is followed by Dr. Albertina Silvestre MD as her primary care provider.     DM2: diagnosed years ago after taking steroids. Current regimen includes none. Last A1C 6.1%    Sarcoidosis:  diagnosed about 5 years ago. Diagnosed by Pulmonology Dr Gimenez. She follows with Dr. Benz/Dr. Cortes with Dermatology. She is on plaquenil. She is taking higher dose of steroids due to skin exacerbation     Primary lymphedema: sees Dr. Lizama. Using compression hose.      Nephrology history: No FH of kidney disease, no nephrolithiasis, or recurrent UTIs. No OTC medications including NSAIDs. She used to take ibuprofen 500 mg tablets as needed for pain. Avoiding NSAIDs.     Patient denies any CP, SOB, peripheral edema, hematuria, changes in urinary habits, or increased frequency of urination.     Past Medical History:  has a past medical history of Collapse, lung, Diabetes mellitus, type 2, Edema, Gastroparesis, Low back pain, Sarcoidosis, and Thyroid disease.     Past Surgical History:   has a past surgical history that includes Chest tube insertion and broken ankle repair.     Family History:  family history includes Diabetes in her mother, sister, and sister; Hypertension in her father and mother; No Known Problems in her brother and brother. No family history of kidney disease.     Social History:   reports that she has never smoked. She has never been exposed to tobacco smoke. She has never used smokeless tobacco. She reports that she does not currently use alcohol. She reports that she does not use drugs.     Allergies: is allergic to bactrim [sulfamethoxazole-trimethoprim] and pcn [penicillins].     Medications: Reviewed including OTC medications,  herbal supplements, and NSAIDS.     Old records have been reviewed.      Review of Systems:  ROS: A 10 point ROS was completed and found to be negative except for that mentioned above.          Physical Exam:  Vitals:    09/25/24 1341   BP: 112/66   Pulse: 96   Resp: 18       Constitutional: sitting in chair, in NAD  Eyes: EOMI, white sclera  ENMT: moist mucus membranes, nares patent  Cardiovascular: normal rate, S1/S2 noted, no edema  Respiratory: symmetrical chest expansion, CTA-B  Gastrointestinal: +BS, soft, NT/ND  Musculoskeletal: normal, no joint erythema/effusions  Skin: no rash, no purpura, warm extremities  Neurological: Alert and Oriented x 3, afocal    Labs:   Lab Results   Component Value Date     06/11/2024    K 3.9 06/11/2024    CREATININE 1.20 (H) 06/11/2024    ALT 11 (L) 02/06/2024    AST 9 (L) 02/06/2024    HGBA1C 6.1 02/06/2024    LDLCALC 98 02/06/2024    CHOL 156 02/06/2024    HDL 37 (L) 02/06/2024    TRIG 107 02/06/2024    TSH 2.050 02/06/2024    WBC 12.40 (H) 06/11/2024    HGB 11.9 (L) 06/11/2024    HCT 38.2 06/11/2024     06/11/2024        Otherwise Reviewed    Assessment/Plan:       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 Her renal function really started to decline in 2022 with her prior fracture/surgery due to NSAIDs. She stopped NSAIDs.  Also a degree of diabetic nephropathy. Baseline sCr 1.4, today sCr pending. Counseled to avoid nephrotoxic agents such as NSAIDs.    Last eGFR had risen to 54. No complaints today other than burning with urination.     Sarcoidosis: has lung involvement, skin involvement. Will refer to Rheumatology. (Awaiting appt with Lydia Roque NP)    Proteinuria: urine Prot:Creat ratio at next visit    BMD: Calcium and Phosphorus PTH, Vit D next visit    RTC 4 months with CBC, RFP, UA, urine for Prot:creat ratio, PTH, Vit D      Signature:             JULISSA Bolden  [facility]  [facility]  [address] [address]  [phone]        20 minutes of total time spent on the encounter, which includes face to face time and non-face to face time preparing to see the patient (eg, review of tests), Obtaining and/or reviewing separately obtained history, Documenting clinical information in the electronic or other health record, Independently interpreting results (not separately reported) and communicating results to the patient/family/caregiver, or Care coordination (not separately reported).       Date of encounter: 9/25/24

## 2024-09-30 DIAGNOSIS — B95.1 GROUP B STREPTOCOCCAL UTI: Primary | ICD-10-CM

## 2024-09-30 DIAGNOSIS — E55.9 VITAMIN D DEFICIENCY, UNSPECIFIED: ICD-10-CM

## 2024-09-30 DIAGNOSIS — N39.0 GROUP B STREPTOCOCCAL UTI: Primary | ICD-10-CM

## 2024-09-30 RX ORDER — LEVOFLOXACIN 250 MG/1
250 TABLET ORAL DAILY
Qty: 10 TABLET | Refills: 0 | Status: SHIPPED | OUTPATIENT
Start: 2024-09-30 | End: 2024-10-10

## 2024-09-30 RX ORDER — ERGOCALCIFEROL 1.25 MG/1
50000 CAPSULE ORAL WEEKLY
Qty: 12 CAPSULE | Refills: 0 | Status: SHIPPED | OUTPATIENT
Start: 2024-09-30

## 2024-10-22 ENCOUNTER — OFFICE VISIT (OUTPATIENT)
Dept: FAMILY MEDICINE | Facility: CLINIC | Age: 54
End: 2024-10-22
Payer: MEDICAID

## 2024-10-22 VITALS
TEMPERATURE: 98 F | WEIGHT: 289.19 LBS | DIASTOLIC BLOOD PRESSURE: 69 MMHG | BODY MASS INDEX: 49.37 KG/M2 | OXYGEN SATURATION: 98 % | HEIGHT: 64 IN | SYSTOLIC BLOOD PRESSURE: 114 MMHG | RESPIRATION RATE: 19 BRPM | HEART RATE: 66 BPM

## 2024-10-22 DIAGNOSIS — R11.0 NAUSEA: ICD-10-CM

## 2024-10-22 DIAGNOSIS — I10 ESSENTIAL HYPERTENSION: ICD-10-CM

## 2024-10-22 DIAGNOSIS — N18.31 STAGE 3A CHRONIC KIDNEY DISEASE: ICD-10-CM

## 2024-10-22 DIAGNOSIS — N18.31 TYPE 2 DIABETES MELLITUS WITH STAGE 3A CHRONIC KIDNEY DISEASE, WITHOUT LONG-TERM CURRENT USE OF INSULIN: Primary | ICD-10-CM

## 2024-10-22 DIAGNOSIS — E03.9 HYPOTHYROIDISM, UNSPECIFIED TYPE: ICD-10-CM

## 2024-10-22 DIAGNOSIS — Z12.31 ENCOUNTER FOR SCREENING MAMMOGRAM FOR MALIGNANT NEOPLASM OF BREAST: ICD-10-CM

## 2024-10-22 DIAGNOSIS — E11.22 TYPE 2 DIABETES MELLITUS WITH STAGE 3A CHRONIC KIDNEY DISEASE, WITHOUT LONG-TERM CURRENT USE OF INSULIN: Primary | ICD-10-CM

## 2024-10-22 DIAGNOSIS — R60.0 LOCALIZED EDEMA: ICD-10-CM

## 2024-10-22 LAB
ALBUMIN SERPL BCP-MCNC: 3.2 G/DL (ref 3.5–5)
ALBUMIN/GLOB SERPL: 0.8 {RATIO}
ALP SERPL-CCNC: 72 U/L (ref 41–108)
ALT SERPL W P-5'-P-CCNC: 13 U/L (ref 13–56)
ANION GAP SERPL CALCULATED.3IONS-SCNC: 11 MMOL/L (ref 7–16)
AST SERPL W P-5'-P-CCNC: 15 U/L (ref 15–37)
BASOPHILS # BLD AUTO: 0.06 K/UL (ref 0–0.2)
BASOPHILS NFR BLD AUTO: 0.9 % (ref 0–1)
BILIRUB SERPL-MCNC: 0.4 MG/DL (ref ?–1.2)
BUN SERPL-MCNC: 7 MG/DL (ref 7–18)
BUN/CREAT SERPL: 6 (ref 6–20)
CALCIUM SERPL-MCNC: 9.7 MG/DL (ref 8.5–10.1)
CHLORIDE SERPL-SCNC: 109 MMOL/L (ref 98–107)
CO2 SERPL-SCNC: 27 MMOL/L (ref 21–32)
CREAT SERPL-MCNC: 1.13 MG/DL (ref 0.55–1.02)
DIFFERENTIAL METHOD BLD: ABNORMAL
EGFR (NO RACE VARIABLE) (RUSH/TITUS): 58 ML/MIN/1.73M2
EOSINOPHIL # BLD AUTO: 0.48 K/UL (ref 0–0.5)
EOSINOPHIL NFR BLD AUTO: 7.1 % (ref 1–4)
ERYTHROCYTE [DISTWIDTH] IN BLOOD BY AUTOMATED COUNT: 14.2 % (ref 11.5–14.5)
GLOBULIN SER-MCNC: 3.9 G/DL (ref 2–4)
GLUCOSE SERPL-MCNC: 114 MG/DL (ref 74–106)
HCT VFR BLD AUTO: 38.4 % (ref 38–47)
HGB BLD-MCNC: 11.9 G/DL (ref 12–16)
IMM GRANULOCYTES # BLD AUTO: 0.03 K/UL (ref 0–0.04)
IMM GRANULOCYTES NFR BLD: 0.4 % (ref 0–0.4)
LYMPHOCYTES # BLD AUTO: 2.16 K/UL (ref 1–4.8)
LYMPHOCYTES NFR BLD AUTO: 32 % (ref 27–41)
MCH RBC QN AUTO: 23.4 PG (ref 27–31)
MCHC RBC AUTO-ENTMCNC: 31 G/DL (ref 32–36)
MCV RBC AUTO: 75.6 FL (ref 80–96)
MONOCYTES # BLD AUTO: 0.44 K/UL (ref 0–0.8)
MONOCYTES NFR BLD AUTO: 6.5 % (ref 2–6)
MPC BLD CALC-MCNC: 10.2 FL (ref 9.4–12.4)
NEUTROPHILS # BLD AUTO: 3.57 K/UL (ref 1.8–7.7)
NEUTROPHILS NFR BLD AUTO: 53.1 % (ref 53–65)
NRBC # BLD AUTO: 0 X10E3/UL
NRBC, AUTO (.00): 0 %
PLATELET # BLD AUTO: 206 K/UL (ref 150–400)
POTASSIUM SERPL-SCNC: 3 MMOL/L (ref 3.5–5.1)
PROT SERPL-MCNC: 7.1 G/DL (ref 6.4–8.2)
RBC # BLD AUTO: 5.08 M/UL (ref 4.2–5.4)
SODIUM SERPL-SCNC: 144 MMOL/L (ref 136–145)
TSH SERPL DL<=0.005 MIU/L-ACNC: 3.1 UIU/ML (ref 0.36–3.74)
WBC # BLD AUTO: 6.74 K/UL (ref 4.5–11)

## 2024-10-22 PROCEDURE — 3066F NEPHROPATHY DOC TX: CPT | Mod: CPTII,,, | Performed by: NURSE PRACTITIONER

## 2024-10-22 PROCEDURE — 3008F BODY MASS INDEX DOCD: CPT | Mod: CPTII,,, | Performed by: NURSE PRACTITIONER

## 2024-10-22 PROCEDURE — 3074F SYST BP LT 130 MM HG: CPT | Mod: CPTII,,, | Performed by: NURSE PRACTITIONER

## 2024-10-22 PROCEDURE — 3051F HG A1C>EQUAL 7.0%<8.0%: CPT | Mod: CPTII,,, | Performed by: NURSE PRACTITIONER

## 2024-10-22 PROCEDURE — 3061F NEG MICROALBUMINURIA REV: CPT | Mod: CPTII,,, | Performed by: NURSE PRACTITIONER

## 2024-10-22 PROCEDURE — 80053 COMPREHEN METABOLIC PANEL: CPT | Mod: ,,, | Performed by: CLINICAL MEDICAL LABORATORY

## 2024-10-22 PROCEDURE — 3078F DIAST BP <80 MM HG: CPT | Mod: CPTII,,, | Performed by: NURSE PRACTITIONER

## 2024-10-22 PROCEDURE — 99214 OFFICE O/P EST MOD 30 MIN: CPT | Mod: ,,, | Performed by: NURSE PRACTITIONER

## 2024-10-22 PROCEDURE — 1160F RVW MEDS BY RX/DR IN RCRD: CPT | Mod: CPTII,,, | Performed by: NURSE PRACTITIONER

## 2024-10-22 PROCEDURE — 84443 ASSAY THYROID STIM HORMONE: CPT | Mod: ,,, | Performed by: CLINICAL MEDICAL LABORATORY

## 2024-10-22 PROCEDURE — 1159F MED LIST DOCD IN RCRD: CPT | Mod: CPTII,,, | Performed by: NURSE PRACTITIONER

## 2024-10-22 PROCEDURE — 83036 HEMOGLOBIN GLYCOSYLATED A1C: CPT | Mod: ,,, | Performed by: CLINICAL MEDICAL LABORATORY

## 2024-10-22 PROCEDURE — 85025 COMPLETE CBC W/AUTO DIFF WBC: CPT | Mod: ,,, | Performed by: CLINICAL MEDICAL LABORATORY

## 2024-10-22 RX ORDER — LEVOTHYROXINE SODIUM 50 UG/1
50 TABLET ORAL
Qty: 30 TABLET | Refills: 5 | Status: SHIPPED | OUTPATIENT
Start: 2024-10-22

## 2024-10-22 RX ORDER — METOCLOPRAMIDE HYDROCHLORIDE 5 MG/1
10 TABLET, ORALLY DISINTEGRATING ORAL 2 TIMES DAILY
Qty: 60 TABLET | Refills: 5 | Status: SHIPPED | OUTPATIENT
Start: 2024-10-22

## 2024-10-22 RX ORDER — FUROSEMIDE 20 MG/1
20 TABLET ORAL DAILY PRN
Qty: 30 TABLET | Refills: 2 | Status: SHIPPED | OUTPATIENT
Start: 2024-10-22

## 2024-10-22 NOTE — PROGRESS NOTES
JULISSA Harp   [facility] [place]  [address]  [place] [address]     PATIENT NAME: Zahra Gandara  : 1970  DATE: 10/22/24  MRN: [id number]      Billing Provider: JULISSA Harp  Level of Service:   Patient PCP Information       Provider PCP Type    JULISSA Harp General            Reason for Visit / Chief Complaint: Diabetes (Pt is here for her check up. She is wanting labs done. She said that everything is going well. She said that her weight is flunctuating. She is wanting to get her mammogram done at [facility]. She sees several specialist, cari hampton,soraya cohen. She wants to speak to provider about care gaps.)   Health Maintenance Due   Topic Date Due    Hepatitis C Screening  Never done    Cervical Cancer Screening  Never done    Pneumococcal Vaccines (Age 0-64) (1 of 2 - PCV) Never done    Foot Exam  Never done    Eye Exam  Never done    HIV Screening  Never done    Low Dose Statin  Never done    Influenza Vaccine (1) Never done    COVID-19 Vaccine (3 - 2024- season) 2024          Update PCP  Update Chief Complaint         History of Present Illness / Problem Focused Workflow     Zahra Gandara presents to the clinic for check up. She is wanting labs done. She said that everything is going well. She said that her weight is flunctuating. She is wanting to get her mammogram done at [facility]. She sees several specialist, cari hampton,renetta liu,soraya henao. She wants to speak to provider about care gaps. She is unsure about getting flu shot as she normally gets sick when she takes this. She denies any other needs at this time. NAD noted.     Hemoglobin A1C   Date Value Ref Range Status   10/22/2024 7.6 (H) 4.5 - 6.6 % Final     Comment:       Normal:               <5.7%  Pre-Diabetic:       5.7% to 6.4%  Diabetic:             >6.4%  Diabetic Goal:     <7%   2024 6.1 4.5 - 6.6 % Final     Comment:       Normal:                <5.7%  Pre-Diabetic:       5.7% to 6.4%  Diabetic:             >6.4%  Diabetic Goal:     <7%   05/18/2022 6.7 (H) 4.2 - 6.0 % Final     Comment:     For evaluation of glycemic control in known diabetic non-pregnant adults:      A1C <7% lower or higher target A1C values maybe appropriate for individual patients.  For the diagnosis of diabetes mellitus: A1C >/=6.5%  Increased risk for diabetes mellitus:   A1C 5.7-6.4%        CMP  Sodium   Date Value Ref Range Status   10/22/2024 144 136 - 145 mmol/L Final     Potassium   Date Value Ref Range Status   10/22/2024 3.0 (L) 3.5 - 5.1 mmol/L Final     Chloride   Date Value Ref Range Status   10/22/2024 109 (H) 98 - 107 mmol/L Final     CO2   Date Value Ref Range Status   10/22/2024 27 21 - 32 mmol/L Final     Glucose   Date Value Ref Range Status   10/22/2024 114 (H) 74 - 106 mg/dL Final     BUN   Date Value Ref Range Status   10/22/2024 7 7 - 18 mg/dL Final     Creatinine   Date Value Ref Range Status   10/22/2024 1.13 (H) 0.55 - 1.02 mg/dL Final     Calcium   Date Value Ref Range Status   10/22/2024 9.7 8.5 - 10.1 mg/dL Final     Total Protein   Date Value Ref Range Status   10/22/2024 7.1 6.4 - 8.2 g/dL Final     Albumin   Date Value Ref Range Status   10/22/2024 3.2 (L) 3.5 - 5.0 g/dL Final     Bilirubin, Total   Date Value Ref Range Status   10/22/2024 0.4 >0.0 - 1.2 mg/dL Final     Alk Phos   Date Value Ref Range Status   10/22/2024 72 41 - 108 U/L Final     AST   Date Value Ref Range Status   10/22/2024 15 15 - 37 U/L Final     ALT   Date Value Ref Range Status   10/22/2024 13 13 - 56 U/L Final     Anion Gap   Date Value Ref Range Status   10/22/2024 11 7 - 16 mmol/L Final     eGFR   Date Value Ref Range Status   10/22/2024 58 (L) >=60 mL/min/1.73m2 Final        Lab Results   Component Value Date    WBC 6.74 10/22/2024    RBC 5.08 10/22/2024    HGB 11.9 (L) 10/22/2024    HCT 38.4 10/22/2024    MCV 75.6 (L) 10/22/2024    MCH 23.4 (L) 10/22/2024    MCHC 31.0 (L)  10/22/2024    RDW 14.2 10/22/2024     10/22/2024    MPV 10.2 10/22/2024    LYMPH 32.0 10/22/2024    LYMPH 2.16 10/22/2024    MONO 6.5 (H) 10/22/2024    EOS 0.48 10/22/2024    BASO 0.06 10/22/2024    EOSINOPHIL 7.1 (H) 10/22/2024    BASOPHIL 0.9 10/22/2024        Lab Results   Component Value Date    CHOL 156 02/06/2024     Lab Results   Component Value Date    HDL 37 (L) 02/06/2024     Lab Results   Component Value Date    LDLCALC 98 02/06/2024     Lab Results   Component Value Date    TRIG 107 02/06/2024     Lab Results   Component Value Date    CHOLHDL 4.2 02/06/2024        Wt Readings from Last 3 Encounters:   10/28/24 1108 131.1 kg (289 lb)   10/28/24 1105 131.1 kg (289 lb)   10/22/24 0930 131.2 kg (289 lb 3.2 oz)   09/25/24 1341 130.6 kg (288 lb)        BP Readings from Last 3 Encounters:   10/22/24 114/69   09/25/24 112/66   06/11/24 126/72        Review of Systems     Review of Systems   Constitutional: Negative.    Respiratory: Negative.     Cardiovascular: Negative.    Gastrointestinal: Negative.    Endocrine: Negative.    Genitourinary: Negative.    Musculoskeletal:  Positive for arthralgias.   Integumentary:  Negative.   Allergic/Immunologic: Negative.    Neurological: Negative.    Hematological: Negative.    Psychiatric/Behavioral: Negative.          Medical / Social / Family History     Past Medical History:   Diagnosis Date    Collapse, lung     Diabetes mellitus, type 2     with Gastroparesis    Edema     Gastroparesis     Low back pain     Sarcoidosis     Lung and Cutaneous Sarcoid    Thyroid disease     Hypothyroid       Past Surgical History:   Procedure Laterality Date    broken ankle repair      CHEST TUBE INSERTION         Social History  Ms.  reports that she has never smoked. She has never been exposed to tobacco smoke. She has never used smokeless tobacco. She reports that she does not currently use alcohol. She reports that she does not use drugs.    Family History  Ms.'s family  "history includes Diabetes in her mother, sister, and sister; Hypertension in her father and mother; No Known Problems in her brother and brother.    Medications and Allergies     Medications  Outpatient Medications Marked as Taking for the 10/22/24 encounter (Office Visit) with Lilly Haney FNP   Medication Sig Dispense Refill    dapagliflozin propanediol (FARXIGA) 10 mg tablet Take 1 tablet (10 mg total) by mouth once daily. 90 tablet 3    ergocalciferol (ERGOCALCIFEROL) 50,000 unit Cap TAKE 1 CAPSULE BY MOUTH EVERY 7 DAYS FOR 12 DOSES 12 capsule 0    fluticasone-salmeterol diskus inhaler 250-50 mcg Inhale 1 puff into the lungs 2 (two) times daily. Controller 60 each 5    hydrocortisone 2.5 % cream Apply topically 2 (two) times daily. For the face 28 g 5    hydroxychloroquine (PLAQUENIL) 200 mg tablet Take 1 tablet (200 mg total) by mouth 2 (two) times daily. 60 tablet 5    predniSONE (DELTASONE) 20 MG tablet Take 1 tablet (20 mg total) by mouth once daily. Take 1 tablet every even day and take 1/2 a tablet every odd day. 90 tablet 2    [DISCONTINUED] halobetasol (ULTRAVATE) 0.05 % cream APPLY TOPICALLY TO THE AFFECTED AREA TWICE DAILY 60 g 5    [DISCONTINUED] levothyroxine (SYNTHROID) 50 MCG tablet Take 50 mcg by mouth.      [DISCONTINUED] metoclopramide HCl 5 mg TbDL Take 10 mg by mouth 2 (two) times a day. 60 tablet 5    [DISCONTINUED] potassium chloride SA (K-DUR,KLOR-CON) 20 MEQ tablet Take 1 tablet (20 mEq total) by mouth 2 (two) times daily. 60 tablet 5       Allergies  Review of patient's allergies indicates:   Allergen Reactions    Bactrim [sulfamethoxazole-trimethoprim] Hives    Pcn [penicillins] Hives       Physical Examination     Vitals:    10/22/24 0930   BP: 114/69   BP Location: Left arm   Patient Position: Sitting   Pulse: 66   Resp: 19   Temp: 98.4 °F (36.9 °C)   TempSrc: Oral   SpO2: 98%   Weight: 131.2 kg (289 lb 3.2 oz)   Height: 5' 4" (1.626 m)      Physical Exam  Constitutional:       " Appearance: Normal appearance. She is obese.   HENT:      Head: Normocephalic.   Eyes:      Extraocular Movements: Extraocular movements intact.   Cardiovascular:      Rate and Rhythm: Normal rate and regular rhythm.      Pulses: Normal pulses.      Heart sounds: Normal heart sounds.   Pulmonary:      Effort: Pulmonary effort is normal.      Breath sounds: Normal breath sounds.   Skin:     General: Skin is warm and dry.      Capillary Refill: Capillary refill takes less than 2 seconds.   Neurological:      General: No focal deficit present.      Mental Status: She is alert and oriented to person, place, and time.   Psychiatric:         Mood and Affect: Mood normal.         Behavior: Behavior normal.          Assessment and Plan (including Health Maintenance)      Problem List  Smart Sets  Document Outside HM   :    Plan:     There are no Patient Instructions on file for this visit.       Health Maintenance Due   Topic Date Due    Hepatitis C Screening  Never done    Cervical Cancer Screening  Never done    Pneumococcal Vaccines (Age 0-64) (1 of 2 - PCV) Never done    Foot Exam  Never done    Eye Exam  Never done    HIV Screening  Never done    Low Dose Statin  Never done    Influenza Vaccine (1) Never done    COVID-19 Vaccine (3 - 2024-25 season) 09/01/2024       Problem List Items Addressed This Visit       Essential hypertension    Current Assessment & Plan     Blood pressure is controlled. Current medical regimen is effective;   Will adjust according to results and monitor.     1) Check your blood pressure at home. Please notify me at the clinic if the systolic (top number) is consistently over 140 or under 110 or if the diastolic (bottom number) is consistently over 90 or under 60.  2) Regular aerobic physical activity ( e.g. brisk walking) at least 30 min 5 out of 7 days of the week  3) Low sodium diet.  4) Take your meds as directed  5) To the ER for any signs of chest pain/tightness/palpitations/shortness of  breath/difficulty speaking/numbness or tingling in face or extremities  6) Have yearly eye exams           Relevant Orders    CBC Auto Differential (Completed)    Comprehensive Metabolic Panel (Completed)    Hypothyroidism    Current Assessment & Plan     Labs pending. Will inform of results when available.          Relevant Medications    levothyroxine (SYNTHROID) 50 MCG tablet    Other Relevant Orders    CBC Auto Differential (Completed)    Comprehensive Metabolic Panel (Completed)    TSH (Completed)    Nausea    Current Assessment & Plan     Nausea  is controlled. Current medical regimen is effective;   Will adjust according to results and monitor.          Relevant Medications    metoclopramide HCl 5 mg TbDL    Stage 3a chronic kidney disease    Current Assessment & Plan     Labs pending. Will inform of results when available.          Relevant Orders    CBC Auto Differential (Completed)    Comprehensive Metabolic Panel (Completed)    Type 2 diabetes mellitus with stage 3a chronic kidney disease, without long-term current use of insulin - Primary    Current Assessment & Plan     Labs pending. Will inform of results when available.     Work on diet, specfically cutting back on bread, breaded things and cereal  Exercise at least 20-30 mins daily  Add free weights if you can even very light weight will help  Keep an on sugars, fasting sugar goal is , after eating no greater than 180  A1C goal is 7.0% or less          Relevant Orders    CBC Auto Differential (Completed)    Comprehensive Metabolic Panel (Completed)    Hemoglobin A1C (Completed)     Other Visit Diagnoses       Encounter for screening mammogram for malignant neoplasm of breast        Relevant Orders    Mammo Digital Screening Bilat w/ Justyn (Completed)    Localized edema        Relevant Medications    furosemide (LASIX) 20 MG tablet          Type 2 diabetes mellitus with stage 3a chronic kidney disease, without long-term current use of insulin  -      CBC Auto Differential; Future; Expected date: 10/22/2024  -     Comprehensive Metabolic Panel; Future; Expected date: 10/22/2024  -     Hemoglobin A1C; Future; Expected date: 10/22/2024    Hypothyroidism, unspecified type  -     CBC Auto Differential; Future; Expected date: 10/22/2024  -     Comprehensive Metabolic Panel; Future; Expected date: 10/22/2024  -     TSH; Future; Expected date: 10/22/2024  -     levothyroxine (SYNTHROID) 50 MCG tablet; Take 1 tablet (50 mcg total) by mouth before breakfast.  Dispense: 30 tablet; Refill: 5    Essential hypertension  -     CBC Auto Differential; Future; Expected date: 10/22/2024  -     Comprehensive Metabolic Panel; Future; Expected date: 10/22/2024    Stage 3a chronic kidney disease  -     CBC Auto Differential; Future; Expected date: 10/22/2024  -     Comprehensive Metabolic Panel; Future; Expected date: 10/22/2024    Encounter for screening mammogram for malignant neoplasm of breast  -     Mammo Digital Screening Bilat w/ Justyn; Future; Expected date: 10/22/2024    Nausea  -     metoclopramide HCl 5 mg TbDL; Take 10 mg by mouth 2 (two) times a day.  Dispense: 60 tablet; Refill: 5    Localized edema  -     furosemide (LASIX) 20 MG tablet; Take 1 tablet (20 mg total) by mouth daily as needed (swelling).  Dispense: 30 tablet; Refill: 2       Health Maintenance Topics with due status: Not Due       Topic Last Completion Date    Lipid Panel 02/06/2024    Diabetes Urine Screening 06/11/2024    Hemoglobin A1c 10/22/2024    Mammogram 10/29/2024    RSV Vaccine (Age 60+ and Pregnant patients) Not Due         Future Appointments   Date Time Provider Department Center   12/20/2024  8:30 AM Mellisa Cortes MD [facility] DERM [place]   12/20/2024 10:30 AM Alex Gimenez MD [facility]  PULM [facility]   2/13/2025  2:40 PM Eva Enriquez DO OB NE[facility]   4/22/2025  9:20 AM Lilly Haney FNP [facility]        Follow up in about 6 months (around 4/22/2025), or  if symptoms worsen or fail to improve.    Health Maintenance Due   Topic Date Due    Hepatitis C Screening  Never done    Cervical Cancer Screening  Never done    Pneumococcal Vaccines (Age 0-64) (1 of 2 - PCV) Never done    Foot Exam  Never done    Eye Exam  Never done    HIV Screening  Never done    Low Dose Statin  Never done    Influenza Vaccine (1) Never done    COVID-19 Vaccine (3 - 2024-25 season) 09/01/2024        Signature:  JULISSA Harp    Date of encounter: 10/22/24

## 2024-10-23 DIAGNOSIS — E87.6 HYPOKALEMIA: ICD-10-CM

## 2024-10-23 LAB
EST. AVERAGE GLUCOSE BLD GHB EST-MCNC: 171 MG/DL
HBA1C MFR BLD HPLC: 7.6 % (ref 4.5–6.6)

## 2024-10-23 RX ORDER — POTASSIUM CHLORIDE 20 MEQ/1
20 TABLET, EXTENDED RELEASE ORAL 2 TIMES DAILY
Qty: 60 TABLET | Refills: 5 | Status: SHIPPED | OUTPATIENT
Start: 2024-10-23

## 2024-10-23 NOTE — PROGRESS NOTES
Please let pt know her A1c is up to 7.6 from 6.1. I would not change any medications right now, but she does need to watch her diet in carbs. Her potassium is low, it looks like she should be out of her potassium according to the last time sent it in. Please see if she is taking this as we have her taking 20meq twice daily. If she is not taking, start back taking this dose. If she has been taking, have her increase this to 2 tablets twice daily for a week and come back next as a lab only to have rechecked. I will got ahead and refill this. Everything else looks good. Thanks!

## 2024-10-28 ENCOUNTER — HOSPITAL ENCOUNTER (OUTPATIENT)
Dept: RADIOLOGY | Facility: HOSPITAL | Age: 54
Discharge: HOME | End: 2024-10-28
Attending: NURSE PRACTITIONER
Payer: MEDICAID

## 2024-10-28 VITALS — WEIGHT: 289 LBS | BODY MASS INDEX: 49.34 KG/M2 | HEIGHT: 64 IN

## 2024-10-28 PROCEDURE — 77063 BREAST TOMOSYNTHESIS BI: CPT | Mod: TC

## 2024-10-28 PROCEDURE — 77063 BREAST TOMOSYNTHESIS BI: CPT | Mod: 26,,, | Performed by: RADIOLOGY

## 2024-10-28 PROCEDURE — 77067 SCR MAMMO BI INCL CAD: CPT | Mod: TC

## 2024-10-28 PROCEDURE — 77067 SCR MAMMO BI INCL CAD: CPT | Mod: 26,,, | Performed by: RADIOLOGY

## 2025-02-13 ENCOUNTER — OFFICE VISIT (OUTPATIENT)
Dept: NEPHROLOGY | Facility: CLINIC | Age: 55
End: 2025-02-13
Payer: MEDICARE

## 2025-02-13 VITALS
SYSTOLIC BLOOD PRESSURE: 110 MMHG | DIASTOLIC BLOOD PRESSURE: 62 MMHG | HEART RATE: 72 BPM | OXYGEN SATURATION: 98 % | HEIGHT: 64 IN | BODY MASS INDEX: 45.96 KG/M2 | WEIGHT: 269.19 LBS | RESPIRATION RATE: 18 BRPM

## 2025-02-13 DIAGNOSIS — D86.9 SARCOIDOSIS: ICD-10-CM

## 2025-02-13 DIAGNOSIS — M35.00 SICCA, UNSPECIFIED TYPE: ICD-10-CM

## 2025-02-13 DIAGNOSIS — N18.31 STAGE 3A CHRONIC KIDNEY DISEASE: Primary | ICD-10-CM

## 2025-02-13 PROCEDURE — 3008F BODY MASS INDEX DOCD: CPT | Mod: CPTII,,, | Performed by: NURSE PRACTITIONER

## 2025-02-13 PROCEDURE — 1159F MED LIST DOCD IN RCRD: CPT | Mod: CPTII,,, | Performed by: NURSE PRACTITIONER

## 2025-02-13 PROCEDURE — 99213 OFFICE O/P EST LOW 20 MIN: CPT | Mod: S$PBB,,, | Performed by: NURSE PRACTITIONER

## 2025-02-13 PROCEDURE — 99999 PR PBB SHADOW E&M-EST. PATIENT-LVL IV: CPT | Mod: PBBFAC,,, | Performed by: NURSE PRACTITIONER

## 2025-02-13 PROCEDURE — 3078F DIAST BP <80 MM HG: CPT | Mod: CPTII,,, | Performed by: NURSE PRACTITIONER

## 2025-02-13 PROCEDURE — 99214 OFFICE O/P EST MOD 30 MIN: CPT | Mod: PBBFAC | Performed by: NURSE PRACTITIONER

## 2025-02-13 PROCEDURE — 3066F NEPHROPATHY DOC TX: CPT | Mod: CPTII,,, | Performed by: NURSE PRACTITIONER

## 2025-02-13 PROCEDURE — 3074F SYST BP LT 130 MM HG: CPT | Mod: CPTII,,, | Performed by: NURSE PRACTITIONER

## 2025-02-13 RX ORDER — CEVIMELINE HYDROCHLORIDE 30 MG/1
30 CAPSULE ORAL 3 TIMES DAILY
Qty: 90 CAPSULE | Refills: 11 | Status: SHIPPED | OUTPATIENT
Start: 2025-02-13 | End: 2026-02-13

## 2025-02-13 RX ORDER — PREDNISONE 20 MG/1
TABLET ORAL
COMMUNITY
Start: 2025-01-19

## 2025-02-13 NOTE — PROGRESS NOTES
[facility] History and Physical  Patient Name: Zahra Gandara  MRN: [id number]  Age: 54 y.o.  : 1970    Date: 2025 1:02 PM    Chief Complaint: Follow Up    HPI :   Ms Gandara presents to Nephrology clinic for follow up. She is followed by Dr. Albertina Silvestre MD as her primary care provider.     DM2: diagnosed years ago after taking steroids. Current regimen includes farxiga. Last A1C 6.1%    Sarcoidosis:  diagnosed about 5 years ago. Diagnosed by Pulmonology Dr Gimenez. She follows with Dr. Benz/Dr. Cortes with Dermatology. She is on plaquenil. She is taking dose of steroids due to skin exacerbation     Hypothyroid: Levothyroxine 50 mcg daily    Primary lymphedema: sees Dr. Lizama. Using compression hose.      Nephrology history: No FH of kidney disease, no nephrolithiasis, or recurrent UTIs. No OTC medications including NSAIDs. She used to take ibuprofen 500 mg tablets as needed for pain. Avoiding NSAIDs.     Patient denies any CP, SOB, peripheral edema, hematuria, changes in urinary habits, or increased frequency of urination.     Past Medical History:  has a past medical history of Collapse, lung, Diabetes mellitus, type 2, Edema, Gastroparesis, Low back pain, Sarcoidosis, and Thyroid disease.     Past Surgical History:   has a past surgical history that includes Chest tube insertion and broken ankle repair.     Family History:  family history includes Diabetes in her mother, sister, and sister; Hypertension in her father and mother; No Known Problems in her brother and brother. No family history of kidney disease.     Social History:   reports that she has never smoked. She has never been exposed to tobacco smoke. She has never used smokeless tobacco. She reports that she does not currently use alcohol. She reports that she does not use drugs.     Allergies: is allergic to bactrim [sulfamethoxazole-trimethoprim] and pcn [penicillins].     Medications:  Reviewed including OTC medications, herbal supplements, and NSAIDS.     Old records have been reviewed.      Review of Systems:  ROS: A 10 point ROS was completed and found to be negative except for that mentioned above.          Physical Exam:  Vitals:    02/13/25 1348   BP: 110/62   Pulse: 72   Resp: 18       Constitutional: sitting in chair, in NAD  Eyes: EOMI, white sclera  ENMT: oral mucus membranes very dry, nares patent  Cardiovascular: normal rate, S1/S2 noted, no edema  Respiratory: symmetrical chest expansion, CTA-B  Gastrointestinal: +BS, soft, NT/ND  Musculoskeletal: normal, no joint erythema/effusions  Skin: no rash, no purpura, warm extremities  Neurological: Alert and Oriented x 3, afocal    Labs:   Lab Results   Component Value Date     10/22/2024    K 3.0 (L) 10/22/2024    CREATININE 1.13 (H) 10/22/2024    ALT 13 10/22/2024    AST 15 10/22/2024    HGBA1C 7.6 (H) 10/22/2024    LDLCALC 98 02/06/2024    CHOL 156 02/06/2024    HDL 37 (L) 02/06/2024    TRIG 107 02/06/2024    TSH 3.100 10/22/2024    WBC 6.74 10/22/2024    HGB 11.9 (L) 10/22/2024    HCT 38.4 10/22/2024     10/22/2024        Otherwise Reviewed    Assessment/Plan:       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 Her renal function really started to decline in 2022 with her prior fracture/surgery due to NSAIDs. She stopped NSAIDs.  Also a degree of diabetic nephropathy. Baseline sCr 1.4, today sCr pending. Counseled to avoid nephrotoxic agents such as NSAIDs.    Sarcoidosis: has lung involvement, skin involvement. Will refer to Rheumatology. (Awaiting appt with Lydia Roque NP)    Proteinuria: urine Prot:Creat ratio is pending    BMD: Calcium and Phosphorus PTH, Vit D     RTC 6 months with CBC, RFP, UA, urine for Prot:creat ratio, PTH, Vit D      Signature:             JULISSA Bolden  [facility]  [address]  [facility] [address]  [phone]        20  minutes of total time spent on the encounter, which includes face to face time and non-face to face time preparing to see the patient (eg, review of tests), Obtaining and/or reviewing separately obtained history, Documenting clinical information in the electronic or other health record, Independently interpreting results (not separately reported) and communicating results to the patient/family/caregiver, or Care coordination (not separately reported).       Date of encounter: 2/13/25

## 2025-02-20 ENCOUNTER — RESULTS FOLLOW-UP (OUTPATIENT)
Dept: NEPHROLOGY | Facility: CLINIC | Age: 55
End: 2025-02-20
Payer: MEDICARE

## 2025-02-20 NOTE — TELEPHONE ENCOUNTER
----- Message from JULISSA Schafer sent at 2/20/2025  2:16 PM CST -----  Please let patient know that kidney function remains stable. Her other labs did come back abnormal and she needs to see a Rheumatologist. Wherever she would like to go. Thank you!  ----- Message -----  From: Lab, Background User  Sent: 2/13/2025   3:09 PM CST  To: JULISSA Shoemaker

## 2025-04-22 ENCOUNTER — RESULTS FOLLOW-UP (OUTPATIENT)
Dept: FAMILY MEDICINE | Facility: CLINIC | Age: 55
End: 2025-04-22

## 2025-04-22 ENCOUNTER — PATIENT OUTREACH (OUTPATIENT)
Facility: HOSPITAL | Age: 55
End: 2025-04-22
Payer: MEDICARE

## 2025-04-22 ENCOUNTER — OFFICE VISIT (OUTPATIENT)
Dept: FAMILY MEDICINE | Facility: CLINIC | Age: 55
End: 2025-04-22
Payer: MEDICARE

## 2025-04-22 VITALS
HEIGHT: 64 IN | RESPIRATION RATE: 18 BRPM | BODY MASS INDEX: 43.02 KG/M2 | DIASTOLIC BLOOD PRESSURE: 66 MMHG | WEIGHT: 252 LBS | TEMPERATURE: 98 F | HEART RATE: 74 BPM | SYSTOLIC BLOOD PRESSURE: 99 MMHG | OXYGEN SATURATION: 97 %

## 2025-04-22 DIAGNOSIS — E87.6 HYPOKALEMIA: ICD-10-CM

## 2025-04-22 DIAGNOSIS — Z13.31 POSITIVE DEPRESSION SCREENING: ICD-10-CM

## 2025-04-22 DIAGNOSIS — R79.9 ABNORMAL FINDING OF BLOOD CHEMISTRY, UNSPECIFIED: ICD-10-CM

## 2025-04-22 DIAGNOSIS — E11.22 TYPE 2 DIABETES MELLITUS WITH STAGE 3A CHRONIC KIDNEY DISEASE, WITHOUT LONG-TERM CURRENT USE OF INSULIN: ICD-10-CM

## 2025-04-22 DIAGNOSIS — N18.31 STAGE 3A CHRONIC KIDNEY DISEASE: ICD-10-CM

## 2025-04-22 DIAGNOSIS — R60.0 LOCALIZED EDEMA: ICD-10-CM

## 2025-04-22 DIAGNOSIS — F32.1 MAJOR DEPRESSIVE DISORDER, SINGLE EPISODE, MODERATE: ICD-10-CM

## 2025-04-22 DIAGNOSIS — E03.9 HYPOTHYROIDISM, UNSPECIFIED TYPE: ICD-10-CM

## 2025-04-22 DIAGNOSIS — Z13.220 SCREENING FOR LIPID DISORDERS: ICD-10-CM

## 2025-04-22 DIAGNOSIS — E66.813 CLASS 3 SEVERE OBESITY WITHOUT SERIOUS COMORBIDITY WITH BODY MASS INDEX (BMI) OF 40.0 TO 44.9 IN ADULT, UNSPECIFIED OBESITY TYPE: ICD-10-CM

## 2025-04-22 DIAGNOSIS — N18.31 TYPE 2 DIABETES MELLITUS WITH STAGE 3A CHRONIC KIDNEY DISEASE, WITHOUT LONG-TERM CURRENT USE OF INSULIN: ICD-10-CM

## 2025-04-22 DIAGNOSIS — F33.0 MILD EPISODE OF RECURRENT MAJOR DEPRESSIVE DISORDER: Primary | ICD-10-CM

## 2025-04-22 DIAGNOSIS — E66.01 CLASS 3 SEVERE OBESITY WITHOUT SERIOUS COMORBIDITY WITH BODY MASS INDEX (BMI) OF 40.0 TO 44.9 IN ADULT, UNSPECIFIED OBESITY TYPE: ICD-10-CM

## 2025-04-22 DIAGNOSIS — I10 ESSENTIAL HYPERTENSION: ICD-10-CM

## 2025-04-22 LAB
CHOLEST SERPL-MCNC: 156 MG/DL
CHOLEST/HDLC SERPL: 6 {RATIO}
EST. AVERAGE GLUCOSE BLD GHB EST-MCNC: 97 MG/DL
HBA1C MFR BLD HPLC: 5 %
HDLC SERPL-MCNC: 26 MG/DL (ref 35–60)
LDLC SERPL CALC-MCNC: 103 MG/DL
LDLC/HDLC SERPL: 4 {RATIO}
NONHDLC SERPL-MCNC: 130 MG/DL
TRIGL SERPL-MCNC: 134 MG/DL (ref 37–140)
TSH SERPL DL<=0.005 MIU/L-ACNC: 4.74 UIU/ML (ref 0.35–4.94)
VLDLC SERPL-MCNC: 27 MG/DL

## 2025-04-22 PROCEDURE — 1160F RVW MEDS BY RX/DR IN RCRD: CPT | Mod: ,,, | Performed by: NURSE PRACTITIONER

## 2025-04-22 PROCEDURE — 3044F HG A1C LEVEL LT 7.0%: CPT | Mod: ,,, | Performed by: NURSE PRACTITIONER

## 2025-04-22 PROCEDURE — 80061 LIPID PANEL: CPT | Mod: ,,, | Performed by: CLINICAL MEDICAL LABORATORY

## 2025-04-22 PROCEDURE — 84443 ASSAY THYROID STIM HORMONE: CPT | Mod: ,,, | Performed by: CLINICAL MEDICAL LABORATORY

## 2025-04-22 PROCEDURE — 3074F SYST BP LT 130 MM HG: CPT | Mod: ,,, | Performed by: NURSE PRACTITIONER

## 2025-04-22 PROCEDURE — 1159F MED LIST DOCD IN RCRD: CPT | Mod: ,,, | Performed by: NURSE PRACTITIONER

## 2025-04-22 PROCEDURE — 3066F NEPHROPATHY DOC TX: CPT | Mod: ,,, | Performed by: NURSE PRACTITIONER

## 2025-04-22 PROCEDURE — 99214 OFFICE O/P EST MOD 30 MIN: CPT | Mod: ,,, | Performed by: NURSE PRACTITIONER

## 2025-04-22 PROCEDURE — 3008F BODY MASS INDEX DOCD: CPT | Mod: ,,, | Performed by: NURSE PRACTITIONER

## 2025-04-22 PROCEDURE — 83036 HEMOGLOBIN GLYCOSYLATED A1C: CPT | Mod: ,,, | Performed by: CLINICAL MEDICAL LABORATORY

## 2025-04-22 PROCEDURE — 3078F DIAST BP <80 MM HG: CPT | Mod: ,,, | Performed by: NURSE PRACTITIONER

## 2025-04-22 RX ORDER — FLUOXETINE HYDROCHLORIDE 20 MG/1
20 CAPSULE ORAL DAILY
Qty: 30 CAPSULE | Refills: 5 | Status: SHIPPED | OUTPATIENT
Start: 2025-04-22 | End: 2026-04-22

## 2025-04-22 RX ORDER — LEVOTHYROXINE SODIUM 50 UG/1
50 TABLET ORAL
Qty: 30 TABLET | Refills: 5 | Status: SHIPPED | OUTPATIENT
Start: 2025-04-22

## 2025-04-22 RX ORDER — FUROSEMIDE 20 MG/1
20 TABLET ORAL DAILY PRN
Qty: 30 TABLET | Refills: 2 | Status: SHIPPED | OUTPATIENT
Start: 2025-04-22

## 2025-04-22 NOTE — PROGRESS NOTES
I have reviewed the positive depression score which warrants active treatment with psychotherapy and/or medications.    JULISSA Harp   [facility]  [id number]  [place] [address]     PATIENT NAME: Zahra Gandara  : 1970  DATE: 25  MRN: [id number]      Billing Provider: JULISSA Harp  Level of Service:   Patient PCP Information       Provider PCP Type    JULISSA Harp General            Reason for Visit / Chief Complaint: Health Maintenance (Mrs.Cynthia SHREYA Gandara is a 54 y.o. male who presents to the clinic today  for a 6 month check up./Hepatitis C Screening Never done - declined/Cervical Cancer Screening Never done- declined/Foot Exam Never done- wants an appt for foot care/Diabetic Eye Exam Never done - [facility] in /HIV Screening Never done- declined/Pneumococcal Vaccines (Age 50+)(1 of 2 - PCV) Never done- declined/Low Dose Statin Never done/Influenza Vaccine(1) Never done- declined/COVID-19 Vaccine(3 - 2024-25 season) due), Depression (Pt is having several days of feeling depressed. Would like something to help with this), and Insomnia (Pt is requesting something for insomnia)   Health Maintenance Due   Topic Date Due    Hepatitis C Screening  Never done    Cervical Cancer Screening  Never done    Foot Exam  Never done    Diabetic Eye Exam  Never done    HIV Screening  Never done    Pneumococcal Vaccines (Age 50+) (1 of 2 - PCV) Never done    Low Dose Statin  Never done    COVID-19 Vaccine (3 - 2024-25 season) 2024          Update PCP  Update Chief Complaint         History of Present Illness / Problem Focused Workflow     History of Present Illness    CHIEF COMPLAINT:  Patient presents today for follow up and depression.     DEPRESSION:  She reports feeling confined with sensation of walls closing in, which is not alleviated by going outside. She experiences social isolation, preferring to stay in bed and avoid leaving the house. She has stopped attending [employer]  and visiting family members, including brothers. She denies history of anxiety or depression medication use. She has been in contact with a  regarding potential counseling services.    LABS:  Thyroid levels were normal on recent tests. Labs from February were stable.      ROS:  General: -fever, -chills, -fatigue, -weight gain, -weight loss  Eyes: -vision changes, -redness, -discharge  ENT: -ear pain, -nasal congestion, -sore throat  Cardiovascular: -chest pain, -palpitations, -lower extremity edema  Respiratory: -cough, -shortness of breath  Gastrointestinal: -abdominal pain, -nausea, -vomiting, -diarrhea, -constipation, -blood in stool  Genitourinary: -dysuria, -hematuria, -frequency  Musculoskeletal: -joint pain, -muscle pain  Skin: -rash, -lesion  Neurological: -headache, -dizziness, -numbness, -tingling  Psychiatric: -anxiety, +depression, -sleep difficulty, +self isolation, +feeling isolated, +feeling of suffocation, +new or worsening mood changes, +mood swings          Hemoglobin A1C   Date Value Ref Range Status   04/22/2025 5.0 <=7.0 % Final     Comment:       Normal:               <5.7%  Pre-Diabetic:       5.7% to 6.4%  Diabetic:             >6.4%  Diabetic Goal:     <7%   10/22/2024 7.6 (H) 4.5 - 6.6 % Final     Comment:       Normal:               <5.7%  Pre-Diabetic:       5.7% to 6.4%  Diabetic:             >6.4%  Diabetic Goal:     <7%   02/06/2024 6.1 4.5 - 6.6 % Final     Comment:       Normal:               <5.7%  Pre-Diabetic:       5.7% to 6.4%  Diabetic:             >6.4%  Diabetic Goal:     <7%   05/18/2022 6.7 (H) 4.2 - 6.0 % Final     Comment:     For evaluation of glycemic control in known diabetic non-pregnant adults:      A1C <7% lower or higher target A1C values maybe appropriate for individual patients.  For the diagnosis of diabetes mellitus: A1C >/=6.5%  Increased risk for diabetes mellitus:   A1C 5.7-6.4%        CMP  Sodium   Date Value Ref Range Status   02/13/2025 144  136 - 145 mmol/L Final     Potassium   Date Value Ref Range Status   02/13/2025 3.5 3.5 - 5.1 mmol/L Final     Chloride   Date Value Ref Range Status   02/13/2025 106 98 - 107 mmol/L Final     CO2   Date Value Ref Range Status   02/13/2025 27 22 - 29 mmol/L Final     Glucose   Date Value Ref Range Status   02/13/2025 75 74 - 100 mg/dL Final     BUN   Date Value Ref Range Status   02/13/2025 6 (L) 10 - 20 mg/dL Final     Creatinine   Date Value Ref Range Status   02/13/2025 1.23 (H) 0.55 - 1.02 mg/dL Final     Calcium   Date Value Ref Range Status   02/13/2025 10.8 (H) 8.4 - 10.2 mg/dL Final     Total Protein   Date Value Ref Range Status   10/22/2024 7.1 6.4 - 8.2 g/dL Final     Albumin   Date Value Ref Range Status   02/13/2025 3.5 3.5 - 5.0 g/dL Final     Bilirubin, Total   Date Value Ref Range Status   10/22/2024 0.4 >0.0 - 1.2 mg/dL Final     Alk Phos   Date Value Ref Range Status   10/22/2024 72 41 - 108 U/L Final     AST   Date Value Ref Range Status   10/22/2024 15 15 - 37 U/L Final     ALT   Date Value Ref Range Status   10/22/2024 13 13 - 56 U/L Final     Anion Gap   Date Value Ref Range Status   02/13/2025 15 7 - 16 mmol/L Final     eGFR   Date Value Ref Range Status   02/13/2025 52 (L) >=60 mL/min/1.73m2 Final     Comment:     Estimated GFR calculated using the CKD-EPI creatinine (2021) equation.        Lab Results   Component Value Date    WBC 5.82 02/13/2025    RBC 5.81 (H) 02/13/2025    HGB 13.5 02/13/2025    HCT 43.6 02/13/2025    MCV 75.0 (L) 02/13/2025    MCH 23.2 (L) 02/13/2025    MCHC 31.0 (L) 02/13/2025    RDW 14.7 (H) 02/13/2025     02/13/2025    MPV 10.0 02/13/2025    LYMPH 37.1 02/13/2025    LYMPH 2.16 02/13/2025    MONO 8.4 (H) 02/13/2025    EOS 0.41 02/13/2025    BASO 0.06 02/13/2025    EOSINOPHIL 7.0 (H) 02/13/2025    BASOPHIL 1.0 02/13/2025        Lab Results   Component Value Date    CHOL 156 04/22/2025    CHOL 156 02/06/2024     Lab Results   Component Value Date    HDL 26 (L)  04/22/2025    HDL 37 (L) 02/06/2024     Lab Results   Component Value Date    LDLCALC 103 04/22/2025    LDLCALC 98 02/06/2024     Lab Results   Component Value Date    TRIG 134 04/22/2025    TRIG 107 02/06/2024     Lab Results   Component Value Date    CHOLHDL 6.0 04/22/2025    CHOLHDL 4.2 02/06/2024        Wt Readings from Last 3 Encounters:   04/22/25 0913 114.3 kg (252 lb)   02/13/25 1348 122.1 kg (269 lb 3.2 oz)   10/28/24 1108 131.1 kg (289 lb)   10/28/24 1105 131.1 kg (289 lb)        BP Readings from Last 3 Encounters:   04/22/25 99/66   02/13/25 110/62   10/22/24 114/69        Review of Systems     Review of Systems       Medical / Social / Family History     Past Medical History:   Diagnosis Date    Collapse, lung     Diabetes mellitus, type 2     with Gastroparesis    Edema     Gastroparesis     Low back pain     Sarcoidosis     Lung and Cutaneous Sarcoid    Thyroid disease     Hypothyroid       Past Surgical History:   Procedure Laterality Date    broken ankle repair      CHEST TUBE INSERTION         Social History  Ms.  reports that she has never smoked. She has never been exposed to tobacco smoke. She has never used smokeless tobacco. She reports that she does not currently use alcohol. She reports that she does not use drugs.    Family History  Ms.'s family history includes Diabetes in her mother, sister, and sister; Hypertension in her father and mother; No Known Problems in her brother and brother.    Medications and Allergies     Medications  Outpatient Medications Marked as Taking for the 4/22/25 encounter (Office Visit) with Lilly Haney FNP   Medication Sig Dispense Refill    cevimeline (EVOXAC) 30 mg capsule Take 1 capsule (30 mg total) by mouth 3 (three) times daily. 90 capsule 11    dapagliflozin propanediol (FARXIGA) 10 mg tablet Take 1 tablet (10 mg total) by mouth once daily. 90 tablet 3    ergocalciferol (ERGOCALCIFEROL) 50,000 unit Cap TAKE 1 CAPSULE BY MOUTH EVERY 7 DAYS FOR 12 DOSES 12  "capsule 0    fluticasone-salmeterol diskus inhaler 250-50 mcg Inhale 1 puff into the lungs 2 (two) times daily. Controller 60 each 5    hydroxychloroquine (PLAQUENIL) 200 mg tablet Take 1 tablet (200 mg total) by mouth 2 (two) times daily. 60 tablet 5    metoclopramide HCl 5 mg TbDL Take 10 mg by mouth 2 (two) times a day. 60 tablet 5    potassium chloride SA (K-DUR,KLOR-CON) 20 MEQ tablet Take 1 tablet (20 mEq total) by mouth 2 (two) times daily. 60 tablet 5    predniSONE (DELTASONE) 20 MG tablet TAKE 1 TABLET BY MOUTH ONCE DAILY . TAKE 1 TABLET EVERY EVEN DAY AND TAKE 1/2 A TABLET EVERY ODD DAY.      [DISCONTINUED] furosemide (LASIX) 20 MG tablet Take 1 tablet (20 mg total) by mouth daily as needed (swelling). 30 tablet 2    [DISCONTINUED] levothyroxine (SYNTHROID) 50 MCG tablet Take 1 tablet (50 mcg total) by mouth before breakfast. 30 tablet 5       Allergies  Review of patient's allergies indicates:   Allergen Reactions    Bactrim [sulfamethoxazole-trimethoprim] Hives    Pcn [penicillins] Hives       Physical Examination     Vitals:    04/22/25 0913   BP: 99/66   Pulse: 74   Resp: 18   Temp: 98.1 °F (36.7 °C)   TempSrc: Oral   SpO2: 97%   Weight: 114.3 kg (252 lb)   Height: 5' 4" (1.626 m)      Physical Exam  Constitutional:       Appearance: Normal appearance. She is obese.   HENT:      Head: Normocephalic.   Eyes:      Extraocular Movements: Extraocular movements intact.   Cardiovascular:      Rate and Rhythm: Normal rate and regular rhythm.      Pulses: Normal pulses.      Heart sounds: Normal heart sounds.   Pulmonary:      Effort: Pulmonary effort is normal.      Breath sounds: Normal breath sounds.   Skin:     General: Skin is warm and dry.      Capillary Refill: Capillary refill takes less than 2 seconds.   Neurological:      General: No focal deficit present.      Mental Status: She is alert and oriented to person, place, and time.   Psychiatric:         Mood and Affect: Mood is depressed.         " Behavior: Behavior normal.          Assessment and Plan (including Health Maintenance)      Problem List  Smart Sets  Document Outside HM   :    Plan:     There are no Patient Instructions on file for this visit.       Health Maintenance Due   Topic Date Due    Hepatitis C Screening  Never done    Cervical Cancer Screening  Never done    Foot Exam  Never done    Diabetic Eye Exam  Never done    HIV Screening  Never done    Pneumococcal Vaccines (Age 50+) (1 of 2 - PCV) Never done    Low Dose Statin  Never done    COVID-19 Vaccine (3 - 2024-25 season) 09/01/2024       Problem List Items Addressed This Visit       Abnormal finding of blood chemistry, unspecified    Relevant Orders    Lipid Panel (Completed)    Class 3 severe obesity in adult    Essential hypertension    Hypokalemia    Hypothyroidism    Relevant Medications    levothyroxine (SYNTHROID) 50 MCG tablet    Other Relevant Orders    TSH (Completed)    Localized edema    Relevant Medications    furosemide (LASIX) 20 MG tablet    Major depressive disorder, single episode, moderate    Mild episode of recurrent major depressive disorder - Primary    Relevant Medications    FLUoxetine 20 MG capsule    Positive depression screening    Overview   I have reviewed the positive depression score which warrants active treatment with psychotherapy and/or medications.         Screening for lipid disorders    Relevant Orders    Lipid Panel (Completed)    Stage 3a chronic kidney disease    Type 2 diabetes mellitus with stage 3a chronic kidney disease, without long-term current use of insulin    Relevant Orders    Hemoglobin A1C (Completed)     Assessment & Plan    F32.1 Major depressive disorder, single episode, moderate  N18.9 Chronic kidney disease, unspecified  E11.621 Type 2 diabetes mellitus with foot ulcer    IMPRESSION:  - Assessed mental health status, determining depression as primary concern rather than anxiety and explained difference between anxiety and  depression symptoms.  - Evaluated thyroid function based on previous normal results, determining to continue current thyroid medication dosage.  - Continued Lasix at current dose.    MAJOR DEPRESSIVE DISORDER, SINGLE EPISODE, MODERATE:  - Patient reports feeling depressed, stuck, and socially withdrawn, having stopped attending [employer] and visiting family.  - Symptoms are primarily depression-related, with no reported anxiety attacks.    TYPE 2 DIABETES MELLITUS:  - Reviewed lab results, determining A1c and lipid panel need checking today.  - Labs found stable.  - Ordered A1c and lipid panel for ongoing diabetes monitoring.  - Scheduled follow-up visit with diabetic foot care in 1 month.    CHRONIC KIDNEY DISEASE:  - Patient seen by Miss Garcia for kidney issues in February.  - Prescribed Furosemide (Lasix) as a diuretic.    RHEUMATOLOGICAL EVALUATION:  - Nephrology referred patient to rheumatology for further evaluation.    FOLLOW-UP:  - Scheduled for wellness check appointment on June 11th at 11:00 AM.        Mild episode of recurrent major depressive disorder  -     FLUoxetine 20 MG capsule; Take 1 capsule (20 mg total) by mouth once daily.  Dispense: 30 capsule; Refill: 5    Hypothyroidism, unspecified type  -     levothyroxine (SYNTHROID) 50 MCG tablet; Take 1 tablet (50 mcg total) by mouth before breakfast.  Dispense: 30 tablet; Refill: 5  -     TSH; Future; Expected date: 04/22/2025    Localized edema  -     furosemide (LASIX) 20 MG tablet; Take 1 tablet (20 mg total) by mouth daily as needed (swelling).  Dispense: 30 tablet; Refill: 2    Hypokalemia    Type 2 diabetes mellitus with stage 3a chronic kidney disease, without long-term current use of insulin  -     Hemoglobin A1C; Future; Expected date: 04/22/2025    Essential hypertension    Stage 3a chronic kidney disease    Screening for lipid disorders  -     Lipid Panel; Future; Expected date: 04/22/2025    Class 3 severe obesity without serious comorbidity  with body mass index (BMI) of 40.0 to 44.9 in adult, unspecified obesity type    Abnormal finding of blood chemistry, unspecified  -     Lipid Panel; Future; Expected date: 04/22/2025    Positive depression screening  Comments:  I have reviewed the positive depression score which warrants active treatment with psychotherapy and/or medications.    Major depressive disorder, single episode, moderate       Health Maintenance Topics with due status: Not Due       Topic Last Completion Date    Mammogram 10/29/2024    Diabetes Urine Screening 02/13/2025    Lipid Panel 04/22/2025    Hemoglobin A1c 04/22/2025    RSV Vaccine (Age 60+ and Pregnant patients) Not Due         Future Appointments   Date Time Provider Department Center   5/21/2025  8:00 AM Lilly Haney FNP [facility]   5/29/2025  2:20 PM [facility]   6/3/2025  2:30 PM Mellisa Cortes MD [facility] DERM [place]   6/11/2025 11:00 AM [facility]   8/13/2025  1:20 PM Radha Hobson FNP [facility] NE[facility]        Follow up in about 4 weeks (around 5/20/2025), or if symptoms worsen or fail to improve.    Health Maintenance Due   Topic Date Due    Hepatitis C Screening  Never done    Cervical Cancer Screening  Never done    Foot Exam  Never done    Diabetic Eye Exam  Never done    HIV Screening  Never done    Pneumococcal Vaccines (Age 50+) (1 of 2 - PCV) Never done    Low Dose Statin  Never done    COVID-19 Vaccine (3 - 2024-25 season) 09/01/2024        Signature:  JULISSA Harp    Date of encounter: 4/22/25  This note was generated with the assistance of ambient listening technology. Verbal consent was obtained by the patient and accompanying visitor(s) for the recording of patient appointment to facilitate this note. I attest to having reviewed and edited the generated note for accuracy, though some syntax or spelling errors may persist. Please contact the author  of this note for any clarification.

## 2025-04-22 NOTE — PROGRESS NOTES
Population Health Chart Review & Patient Outreach Details    Updates Requested / Reviewed:  [x]  Care Team Updated      Health Maintenance Topics Addressed and Outreach Outcomes / Actions Taken:  Diabetic Eye Exam [x] Davin sent to [facility]

## 2025-04-22 NOTE — LETTER
AUTHORIZATION FOR RELEASE OF   CONFIDENTIAL INFORMATION    Dear [facility],    We are seeing Zahra Gandara, date of birth 1970, in the clinic at [facility]. Lilly Haney FNP is the patient's PCP. Zahra Gandara has an outstanding lab/procedure at the time we reviewed her chart. In order to help keep her health information updated, she has authorized us to request the following medical record(s):        (  )  MAMMOGRAM                                      (  )  COLONOSCOPY      (  )  PAP SMEAR                                          (  )  OUTSIDE LAB RESULTS     (  )  DEXA SCAN                                          ( X )  EYE EXAM            (  )  FOOT EXAM                                          (  )  ENTIRE RECORD     (  )  OUTSIDE IMMUNIZATIONS                 (  )  _______________         Please fax records to Sandie Bardales LPN Care Coordinator at [phone].       If you have any questions, please call [phone].          Patient Name: Zahra Gandara  : 1970  Patient Phone #: [phone]            Zahra Gandara  MRN: [id number]  : 1970  Age: 54 y.o.  Sex: female         Patient/Legal Guardian Signature  This signature was collected at 10/28/2024    Zahra Gandara     Self  _______________________________   Printed Name/Relationship to Patient      Consent for Examination and Treatment: I hereby authorize the providers and employees of [facility] ([facility]) to provide medical treatment/services which includes, but is not limited to, performing and administering tests and diagnostic procedures that are deemed necessary, including, but not limited to, imaging examinations, blood tests and other laboratory procedures as may be required by the hospital, clinic, or may be ordered by my physician(s) or persons working under the general and/or special instructions of my physician(s).      I understand and agree that this consent covers all  authorized persons, including but not limited to physicians, residents, nurse practitioners, physicians' assistants, specialists, consultants, student nurses, and independently contracted physicians, who are called upon by the physician in charge, to carry out the diagnostic procedures and medical or surgical treatment.     I hereby authorize [facility] to retain or dispose of any specimens or tissue, should there be such remaining from any test or procedure.     I hereby authorize and give consent for [facility] providers and employees to take photographs, images or videotapes of such diagnostic, surgical or treatment procedures of Patient as may be required by [facility] or as may be ordered by a physician. I further acknowledge and agree that [facility] may use cameras or other devices for patient monitoring.     I am aware that the practice of medicine is not an exact science, and I acknowledge that no guarantees have been made to me as to the outcome of any tests, procedures or treatment.     Authorization for Release of Information: I understand that my insurance company and/or their agents may need information necessary to make determinations about payment/reimbursement. I hereby provide authorization to release to all insurance companies, their successors, assignees, other parties with whom they may have contracted, or others acting on their behalf, that are involved with payment for any hospital and/or clinic charges incurred by the patient, any information that they request and deem necessary for payment/reimbursement, and/or quality review.  I further authorize the release of my health information to physicians or other health care practitioners on staff who are involved in my health care now and in the future, and to other health care providers, entities, or institutions for the purpose of my continued care and treatment, including referrals.     REGISTRATION AUTHORIZATION  Form No. 33989 (Rev. 3/25/2024)    Page 1  of 3                       Medicare Patient's Certification and Authorization to Release Information and Payment Request:  I certify that the information given by me in applying for payment under Title XVIII of the Social Security Act is correct. I authorize any lowe of medical or other information about me to release to the Social SecurityAdministration, or its intermediaries or carriers, any information needed for this or a related Medicare claim. I request that payment of authorized benefits be made on my behalf.     Assignment of Insurance Benefits:   I hereby authorize any and all insurance companies, health plans, defined   benefit plans, health insurers or any entity that is or may be responsible for payment of my medical expenses to pay all hospital and medical benefits now due, and to become due and payable to me under any hospital benefits, sick benefits, injury benefits or any other benefit for services rendered to me, including Major Medical Benefits, direct to [facility] and all independently contracted physicians. I assign any and all rights that I may have against any and all insurance companies, health plans, defined benefit plans, health insurers or any entity that is or may be responsible for payment of my medical expenses, including, but not limited to any right to appeal a denial of a claim, any right to bring any action, lawsuit, administrative proceeding, or other cause of action on my behalf. I specifically assign my right to pursue litigation against any and all insurance companies, health plans, defined benefit plans, health insurers or any entity that is or may be responsible for payment of my medical expenses based upon a refusal to pay charges.            E. Valuables: It is understood and agreed that [facility] is not liable for the damage to or loss of any money, jewelry,   documents, dentures, eye glasses, hearing aids, prosthetics, or other property of value.     F. Computer Equipment: I  understand and agree that should I choose to use computer equipment owned by [facility] or if I choose to access the Internet via [facility]s network, I do so at my own risk. [facility] is not responsible for any damage to my computer equipment or to any damages of any type that might arise from my loss of equipment or data.     G. Acceptance of Financial Responsibility:  I agree that in consideration of the services and   supplies that have been   or will be furnished to the patient, I am hereby obligated to pay all charges made for or on the account of the patient according to the standard rates (in effect at the time the services and supplies are delivered) established by [facility], including its Patient Financial Assistance Policy to the extent it is applicable. I understand that I am responsible for all charges, or portions thereof, not covered by insurance or other sources. Patient refunds will be distributed only after balances at all [facility] facilities are paid.     H. Communication Authorization:  I hereby authorize [facility] and its representatives, along with any billing service   or  who may work on their behalf, to contact me on   my cell phone and/or home phone using pre- recorded messages, artificial voice messages, automatic telephone dialing devices or other computer assisted technology, or by electronic      mail, text messaging, or by any other form of electronic communication. This includes, but is not limited to, appointment reminders, yearly physical exam reminders, preventive care reminders, patient campaigns, welcome calls, and calls about account balances on my account or any account on which I am listed as a guarantor. I understand I have the right to opt out of these communications at any time.      Relationship  Between  Facility and  Provider:      I understand that some, but not all, providers furnishing services to the patient are not employees or agents of [facility]. The patient is  under the care and supervision of his/her attending physician, and it is the responsibility of the facility and its nursing staff to carry out the instructions of such physicians. It is the responsibility of the patient's physician/designee to obtain the patient's informed consent, when required, for medical or surgical treatment, special diagnostic or therapeutic procedures, or hospital services rendered for the patient under the special instructions of the physician/designee.           REGISTRATION AUTHORIZATION  Form No. 58311 (Rev. 3/25/2024)    Page 2 of 3                       Immunizations: [facility] shares immunization information with state sponsored health departments to help you and your doctor keep track of your immunization records. By signing, you consent to have this information shared with the health department in your state:                                [place] - LINKS ([place] Immunization Network for Kids Statewide)                                [place] - MIIX ([place] Immunization Information eXchange)                                [place] - ImmPRINT (Immunization Patient Registry with Integrated Technology)     TERM: This authorization is valid for this and subsequent care/treatment I receive at [facility] and will remain valid unless/until revoked in writing by me.     [facility]: As used in this document, [facility] means all [facility] owned and managed facilities, including, but not limited to, all health centers, surgery centers, clinics, urgent care centers, and hospitals.         [facility] complies with applicable Federal civil rights laws and does not discriminate on the basis of race, color, national origin, age, disability, or sex.  ATENCIÓN: si habla español, tiene a booker disposición servicios gratuitos de asistencia lingüística. Eryn camara [phone].  [facility] Ý: N?u b?n nói Ti?ng Vi?t, có các d?ch v? h? tr? ngôn ng? mi?n phí dành cho b?n. G?i s?  [phone].        REGISTRATION AUTHORIZATION  Form No. 77142 (Rev. 3/25/2024)   Page 3 of 3

## 2025-04-23 PROBLEM — Z13.31 POSITIVE DEPRESSION SCREENING: Status: ACTIVE | Noted: 2025-04-23

## 2025-04-23 PROBLEM — R79.9 ABNORMAL FINDING OF BLOOD CHEMISTRY, UNSPECIFIED: Status: ACTIVE | Noted: 2025-04-23

## 2025-04-23 PROBLEM — F33.0 MILD EPISODE OF RECURRENT MAJOR DEPRESSIVE DISORDER: Status: ACTIVE | Noted: 2025-04-23

## 2025-04-24 ENCOUNTER — PATIENT OUTREACH (OUTPATIENT)
Facility: HOSPITAL | Age: 55
End: 2025-04-24
Payer: MEDICARE

## 2025-04-24 NOTE — PROGRESS NOTES
Population Health Chart Review & Patient Outreach Details    Updates Requested / Reviewed:  [x]  Care Team Updated    Health Maintenance Topics Addressed and Outreach Outcomes / Actions Taken:  Diabetic Eye Exam [x] HM Updated with May 2024 Eye Exam (Dr. Ribera). History Updated.

## 2025-05-23 DIAGNOSIS — N18.31 STAGE 3A CHRONIC KIDNEY DISEASE: ICD-10-CM

## 2025-06-02 RX ORDER — DAPAGLIFLOZIN 10 MG/1
10 TABLET, FILM COATED ORAL DAILY
Qty: 90 TABLET | Refills: 3 | Status: SHIPPED | OUTPATIENT
Start: 2025-06-02 | End: 2026-06-02

## 2025-06-03 ENCOUNTER — OFFICE VISIT (OUTPATIENT)
Dept: DERMATOLOGY | Facility: CLINIC | Age: 55
End: 2025-06-03
Payer: MEDICARE

## 2025-06-03 DIAGNOSIS — D86.9 SARCOIDOSIS: Primary | ICD-10-CM

## 2025-06-03 DIAGNOSIS — Z79.899 HIGH RISK MEDICATION USE: ICD-10-CM

## 2025-06-03 RX ORDER — HYDROXYCHLOROQUINE SULFATE 200 MG/1
200 TABLET, FILM COATED ORAL 2 TIMES DAILY
Qty: 60 TABLET | Refills: 5 | Status: SHIPPED | OUTPATIENT
Start: 2025-06-03 | End: 2025-06-03

## 2025-06-03 RX ORDER — HYDROXYCHLOROQUINE SULFATE 200 MG/1
200 TABLET, FILM COATED ORAL 2 TIMES DAILY
Qty: 60 TABLET | Refills: 8 | Status: SHIPPED | OUTPATIENT
Start: 2025-06-03

## 2025-06-06 NOTE — PATIENT INSTRUCTIONS
Counseling and Referral of Other Preventative  (Italic type indicates deductible and co-insurance are waived)    Patient Name: Zahra Gandara  Today's Date: 6/11/2025    Health Maintenance       Date Due Completion Date    Hepatitis C Screening Never done ---    Cervical Cancer Screening Never done ---    Foot Exam Never done ---    HIV Screening Never done ---    Pneumococcal Vaccines (Age 50+) (1 of 2 - PCV) Never done ---    Low Dose Statin Never done ---    TETANUS VACCINE 05/02/2000 5/2/1990    Colorectal Cancer Screening Never done ---    Shingles Vaccine (1 of 2) Never done ---    COVID-19 Vaccine (4 - 2024-25 season) 09/01/2024 10/30/2021    Diabetic Eye Exam 05/10/2025 5/10/2024    Hemoglobin A1c 10/22/2025 4/22/2025    Mammogram 10/29/2025 10/29/2024    Diabetes Urine Screening 02/13/2026 2/13/2025    Lipid Panel 04/22/2026 4/22/2025    RSV Vaccine (Age 60+ and Pregnant patients) (1 - 1-dose 75+ series) 08/27/2045 ---        No orders of the defined types were placed in this encounter.    Counseling and Referral of Other Preventative  (Italic type indicates deductible and co-insurance are waived)    Patient Name: Zahra Gandara  Today's Date: 6/11/2025    Health Maintenance       Date Due Completion Date    Hepatitis C Screening Never done ---    Cervical Cancer Screening Never done ---    Foot Exam Never done ---    HIV Screening Never done ---    Pneumococcal Vaccines (Age 50+) (1 of 2 - PCV) Never done ---    Low Dose Statin Never done ---    TETANUS VACCINE 05/02/2000 5/2/1990    Colorectal Cancer Screening Never done ---    Shingles Vaccine (1 of 2) Never done ---    COVID-19 Vaccine (4 - 2024-25 season) 09/01/2024 10/30/2021    Diabetic Eye Exam 05/10/2025 5/10/2024    Hemoglobin A1c 10/22/2025 4/22/2025    Mammogram 10/29/2025 10/29/2024    Diabetes Urine Screening 02/13/2026 2/13/2025    Lipid Panel 04/22/2026 4/22/2025    RSV Vaccine (Age 60+ and Pregnant patients) (1 - 1-dose 75+ series) 08/27/2045  ---        No orders of the defined types were placed in this encounter.    The following information is provided to all patients.  This information is to help you find resources for any of the problems found today that may be affecting your health:                  Living healthy guide: ms.gov    Understanding Diabetes: www.diabetes.org      Eating healthy: www.cdc.gov/healthyweight      [facility] home safety checklist: www.cdc.gov/steadi/patient.html      Agency on Aging: ms.gov    Alcoholics anonymous (AA): www.aa.org      Physical Activity: www.marie.nih.gov/jt9kivl      Tobacco use: ms.gov

## 2025-06-06 NOTE — PROGRESS NOTES
"  Zahra Gandara presented for an initial Medicare AWV today. The following components were reviewed and updated:    Medical history  Family History  Social history  Allergies and Current Medications  Health Risk Assessment  Health Maintenance  Care Team    **See Completed Assessments for Annual Wellness visit with in the encounter summary    The following assessments were completed:  Depression Screening  Cognitive function Screening  Timed Get Up Test  Whisper Test      Opioid documentation:      Patient does not have a current opioid prescription.          Vitals:    06/11/25 1045   BP: 102/60   BP Location: Left arm   Patient Position: Sitting   Pulse: 68   Resp: 20   Temp: 97.4 °F (36.3 °C)   TempSrc: Oral   SpO2: 98%   Weight: 113.4 kg (250 lb)   Height: 5' 4" (1.626 m)     Body mass index is 42.91 kg/m².       Physical Exam  Constitutional:       Appearance: Normal appearance. She is obese.   HENT:      Head: Normocephalic.   Eyes:      Extraocular Movements: Extraocular movements intact.      Conjunctiva/sclera: Conjunctivae normal.   Cardiovascular:      Rate and Rhythm: Normal rate and regular rhythm.      Pulses: Normal pulses.      Heart sounds: Normal heart sounds.   Pulmonary:      Effort: Pulmonary effort is normal.      Breath sounds: Normal breath sounds.   Musculoskeletal:      Comments: Amb with cane   Skin:     General: Skin is warm and dry.   Neurological:      General: No focal deficit present.      Mental Status: She is alert and oriented to person, place, and time. Mental status is at baseline.   Psychiatric:         Mood and Affect: Mood normal.         Behavior: Behavior normal.           1. Encounter for annual wellness visit (AWV) in Medicare patient  Assessment & Plan:  Attend regularly scheduled office visits.  Monitor/keep log of BP outside of clinic.   Schedule yearly mammogram.  Schedule colonoscopy as needed.  Take medications as prescribed.   Avoid adding table salt to foods. "   Recommend regular physical activity 30 min most days of the week.        2. Hypothyroidism, unspecified type  Assessment & Plan:  Hypothyroidism is controlled. Current medical regimen is effective;   Will adjust according to results and monitor.       3. Type 2 diabetes mellitus with stage 3a chronic kidney disease, without long-term current use of insulin  Assessment & Plan:  Work on diet, specfically cutting back on bread, breaded things and cereal  Exercise at least 20-30 mins daily  Add free weights if you can even very light weight will help  Keep an on sugars, fasting sugar goal is , after eating no greater than 180  A1C goal is 7.0% or less       4. Essential hypertension  Assessment & Plan:  Blood pressure is controlled. Current medical regimen is effective;   Will adjust according to results and monitor.     1) Check your blood pressure at home. Please notify me at the clinic if the systolic (top number) is consistently over 140 or under 110 or if the diastolic (bottom number) is consistently over 90 or under 60.  2) Regular aerobic physical activity ( e.g. brisk walking) at least 30 min 5 out of 7 days of the week  3) Low sodium diet.  4) Take your meds as directed  5) To the ER for any signs of chest pain/tightness/palpitations/shortness of breath/difficulty speaking/numbness or tingling in face or extremities  6) Have yearly eye exams        5. Venous insufficiency  Assessment & Plan:  Venous insufficiency is controlled. Current medical regimen is effective;   Will adjust according to results and monitor.       6. Stage 3a chronic kidney disease  Assessment & Plan:  CKD is controlled. Current medical regimen is effective;   Will adjust according to results and monitor.       7. Morbid obesity  Assessment & Plan:  Pt education given on healthy diet.       8. Mild episode of recurrent major depressive disorder  Assessment & Plan:  Increased Prozac to 40mg daily. Instructed she can take 2 of the  20mg capsules for now then  the 40mg. Pt yareli.     Orders:  -     FLUoxetine (PROZAC) 40 MG capsule; Take 1 capsule (40 mg total) by mouth once daily.  Dispense: 30 capsule; Refill: 5    9. Diabetic nephropathy associated with diabetes mellitus due to underlying condition  Assessment & Plan:  Work on diet, specfically cutting back on bread, breaded things and cereal  Exercise at least 20-30 mins daily  Add free weights if you can even very light weight will help  Keep an on sugars, fasting sugar goal is , after eating no greater than 180  A1C goal is 7.0% or less       10. Need for vaccination  Assessment & Plan:  Pneumonia vaccine administered with no adverse reaction noted.     Orders:  -     pneumoc 20-narciso conj-dip cr(PF) (PREVNAR-20 (PF)) injection Syrg 0.5 mL    11. Screening for colorectal cancer  Assessment & Plan:  Attend regularly scheduled office visits.  Monitor/keep log of BP outside of clinic.   Schedule yearly mammogram.  Schedule colonoscopy as needed.  Take medications as prescribed.   Avoid adding table salt to foods.   Recommend regular physical activity 30 min most days of the week.      Orders:  -     Ambulatory referral/consult to General Surgery; Future; Expected date: 12/11/2025    12. Pap smear for cervical cancer screening  Assessment & Plan:  Attend regularly scheduled office visits.  Monitor/keep log of BP outside of clinic.   Schedule yearly mammogram.  Schedule colonoscopy as needed.  Take medications as prescribed.   Avoid adding table salt to foods.   Recommend regular physical activity 30 min most days of the week.      Orders:  -     Ambulatory referral/consult to Obstetrics / Gynecology; Future; Expected date: 12/11/2025       Health Maintenance Due   Topic Date Due    Hepatitis C Screening  Declined    Cervical Cancer Screening  Referral order to GYN placed today    Foot Exam  Pt has foot care appt on 07/01/2025 with Lilly Haney     HIV Screening  Declined     Pneumococcal Vaccines (Age 50+) (1 of 2 - PCV) Ordered today    Low Dose Statin      TETANUS VACCINE  Pt instructed can get at pharmacy    Colorectal Cancer Screening  Ordered today, attempted to reach and left message with Dr. Luis office to return call to schedule appt.for colonoscopy and order placed. Called pt to notify after leaving clinic that appt scheduled for 06/25/2025 at 8:30 with Dr. Luis.    Shingles Vaccine (1 of 2) Pt instructed can get at pharmacy    COVID-19 Vaccine (4 - 2024-25 season) Pt instructed can get at pharmacy    Diabetic Eye Exam  Scheduled for 07/27/2025 at 8:15 with Dr. Ribera          Provided Zahra with a 5-10 year written screening schedule and personal prevention plan. Recommendations were developed using the USPSTF age appropriate recommendations. Education, counseling, and referrals were provided as needed.  After Visit Summary printed and given to patient which includes a list of additional screenings\tests needed.    Follow up for yearly annual wellness visit    I offered to discuss advanced care planning, including how to pick a person who would make decisions for you if you were unable to make them for yourself, called a health care power of , and what kind of decisions you might make such as use of life sustaining treatments such as ventilators and tube feeding when faced with a life limiting illness recorded on a living will that they will need to know. (How you want to be cared for as you near the end of your natural life)     X Patient is interested in learning more about how to make advanced directives.  I provided them paperwork and offered to discuss this with them.

## 2025-06-11 ENCOUNTER — OFFICE VISIT (OUTPATIENT)
Dept: FAMILY MEDICINE | Facility: CLINIC | Age: 55
End: 2025-06-11
Payer: MEDICARE

## 2025-06-11 VITALS
DIASTOLIC BLOOD PRESSURE: 60 MMHG | BODY MASS INDEX: 42.68 KG/M2 | TEMPERATURE: 97 F | WEIGHT: 250 LBS | RESPIRATION RATE: 20 BRPM | HEIGHT: 64 IN | HEART RATE: 68 BPM | OXYGEN SATURATION: 98 % | SYSTOLIC BLOOD PRESSURE: 102 MMHG

## 2025-06-11 DIAGNOSIS — E66.01 MORBID OBESITY: ICD-10-CM

## 2025-06-11 DIAGNOSIS — I87.2 VENOUS INSUFFICIENCY: ICD-10-CM

## 2025-06-11 DIAGNOSIS — E03.9 HYPOTHYROIDISM, UNSPECIFIED TYPE: ICD-10-CM

## 2025-06-11 DIAGNOSIS — I10 ESSENTIAL HYPERTENSION: ICD-10-CM

## 2025-06-11 DIAGNOSIS — N18.31 STAGE 3A CHRONIC KIDNEY DISEASE: ICD-10-CM

## 2025-06-11 DIAGNOSIS — Z23 NEED FOR VACCINATION: ICD-10-CM

## 2025-06-11 DIAGNOSIS — Z12.11 SCREENING FOR COLORECTAL CANCER: ICD-10-CM

## 2025-06-11 DIAGNOSIS — Z12.12 SCREENING FOR COLORECTAL CANCER: ICD-10-CM

## 2025-06-11 DIAGNOSIS — E11.22 TYPE 2 DIABETES MELLITUS WITH STAGE 3A CHRONIC KIDNEY DISEASE, WITHOUT LONG-TERM CURRENT USE OF INSULIN: ICD-10-CM

## 2025-06-11 DIAGNOSIS — E08.21 DIABETIC NEPHROPATHY ASSOCIATED WITH DIABETES MELLITUS DUE TO UNDERLYING CONDITION: ICD-10-CM

## 2025-06-11 DIAGNOSIS — Z00.00 ENCOUNTER FOR ANNUAL WELLNESS VISIT (AWV) IN MEDICARE PATIENT: Primary | ICD-10-CM

## 2025-06-11 DIAGNOSIS — N18.31 TYPE 2 DIABETES MELLITUS WITH STAGE 3A CHRONIC KIDNEY DISEASE, WITHOUT LONG-TERM CURRENT USE OF INSULIN: ICD-10-CM

## 2025-06-11 DIAGNOSIS — F33.0 MILD EPISODE OF RECURRENT MAJOR DEPRESSIVE DISORDER: ICD-10-CM

## 2025-06-11 DIAGNOSIS — Z12.4 PAP SMEAR FOR CERVICAL CANCER SCREENING: ICD-10-CM

## 2025-06-11 PROBLEM — R30.0 DYSURIA: Status: RESOLVED | Noted: 2024-02-06 | Resolved: 2025-06-11

## 2025-06-11 PROCEDURE — G0402 INITIAL PREVENTIVE EXAM: HCPCS | Mod: ,,, | Performed by: NURSE PRACTITIONER

## 2025-06-11 PROCEDURE — 1159F MED LIST DOCD IN RCRD: CPT | Mod: ,,, | Performed by: NURSE PRACTITIONER

## 2025-06-11 PROCEDURE — G0009 ADMIN PNEUMOCOCCAL VACCINE: HCPCS | Mod: ,,, | Performed by: NURSE PRACTITIONER

## 2025-06-11 PROCEDURE — 3074F SYST BP LT 130 MM HG: CPT | Mod: ,,, | Performed by: NURSE PRACTITIONER

## 2025-06-11 PROCEDURE — 3044F HG A1C LEVEL LT 7.0%: CPT | Mod: ,,, | Performed by: NURSE PRACTITIONER

## 2025-06-11 PROCEDURE — 96160 PT-FOCUSED HLTH RISK ASSMT: CPT | Mod: ,,, | Performed by: NURSE PRACTITIONER

## 2025-06-11 PROCEDURE — 3078F DIAST BP <80 MM HG: CPT | Mod: ,,, | Performed by: NURSE PRACTITIONER

## 2025-06-11 PROCEDURE — 90677 PCV20 VACCINE IM: CPT | Mod: ,,, | Performed by: NURSE PRACTITIONER

## 2025-06-11 PROCEDURE — 3066F NEPHROPATHY DOC TX: CPT | Mod: ,,, | Performed by: NURSE PRACTITIONER

## 2025-06-11 RX ORDER — FLUOXETINE HYDROCHLORIDE 40 MG/1
40 CAPSULE ORAL DAILY
Qty: 30 CAPSULE | Refills: 5 | Status: SHIPPED | OUTPATIENT
Start: 2025-06-11 | End: 2026-06-11

## 2025-06-11 NOTE — ASSESSMENT & PLAN NOTE
Venous insufficiency is controlled. Current medical regimen is effective;   Will adjust according to results and monitor.

## 2025-06-11 NOTE — ASSESSMENT & PLAN NOTE
CKD is controlled. Current medical regimen is effective;   Will adjust according to results and monitor.

## 2025-06-11 NOTE — ASSESSMENT & PLAN NOTE
Work on diet, specfically cutting back on bread, breaded things and cereal  Exercise at least 20-30 mins daily  Add free weights if you can even very light weight will help  Keep an on sugars, fasting sugar goal is , after eating no greater than 180  A1C goal is 7.0% or less

## 2025-06-11 NOTE — ASSESSMENT & PLAN NOTE
Hypothyroidism is controlled. Current medical regimen is effective;   Will adjust according to results and monitor.

## 2025-06-11 NOTE — ASSESSMENT & PLAN NOTE
Increased Prozac to 40mg daily. Instructed she can take 2 of the 20mg capsules for now then  the 40mg. Pt yareli.

## 2025-06-20 PROBLEM — M79.10 MYALGIA: Status: ACTIVE | Noted: 2025-06-20

## 2025-06-30 ENCOUNTER — EXTERNAL CHRONIC CARE MANAGEMENT (OUTPATIENT)
Dept: FAMILY MEDICINE | Facility: CLINIC | Age: 55
End: 2025-06-30
Payer: MEDICARE

## 2025-07-02 ENCOUNTER — OFFICE VISIT (OUTPATIENT)
Dept: FAMILY MEDICINE | Facility: CLINIC | Age: 55
End: 2025-07-02
Payer: MEDICARE

## 2025-07-02 VITALS
OXYGEN SATURATION: 97 % | RESPIRATION RATE: 18 BRPM | WEIGHT: 249 LBS | HEART RATE: 54 BPM | DIASTOLIC BLOOD PRESSURE: 66 MMHG | SYSTOLIC BLOOD PRESSURE: 109 MMHG | BODY MASS INDEX: 42.51 KG/M2 | HEIGHT: 64 IN | TEMPERATURE: 98 F

## 2025-07-02 DIAGNOSIS — S90.412D ABRASION, LEFT GREAT TOE, SUBSEQUENT ENCOUNTER: ICD-10-CM

## 2025-07-02 DIAGNOSIS — Q82.0 HEREDITARY LYMPHEDEMA: ICD-10-CM

## 2025-07-02 DIAGNOSIS — Z23 IMMUNIZATION DUE: ICD-10-CM

## 2025-07-02 DIAGNOSIS — G62.9 NEUROPATHY: ICD-10-CM

## 2025-07-02 DIAGNOSIS — L84 CALLUS OF FOOT: ICD-10-CM

## 2025-07-02 DIAGNOSIS — R60.0 LOCALIZED EDEMA: ICD-10-CM

## 2025-07-02 DIAGNOSIS — E08.21 DIABETIC NEPHROPATHY ASSOCIATED WITH DIABETES MELLITUS DUE TO UNDERLYING CONDITION: Primary | ICD-10-CM

## 2025-07-02 NOTE — ASSESSMENT & PLAN NOTE
Lymphedema is controlled. Current medical regimen is effective;   Will adjust according to results and monitor.

## 2025-07-02 NOTE — PROCEDURES
"Foot Care    Date/Time: 7/2/2025 11:00 AM    Performed by: Lilly Haney FNP  Authorized by: Lilly Haney FNP    Time out: Immediately prior to procedure a "time out" was called to verify the correct patient, procedure, equipment, support staff and site/side marked as required.    Consent Done?:  Yes (Verbal)    Nail Care Type:  Debride  Location(s): All  (Left 1st Toe, Left 3rd Toe, Left 2nd Toe, Left 4th Toe, Left 5th Toe, Right 1st Toe, Right 2nd Toe, Right 3rd Toe, Right 4th Toe and Right 5th Toe)  Patient tolerance:  Patient tolerated the procedure well with no immediate complications     Filed calluses smooth to bilateral plantar feet. Pt chico well.     "

## 2025-07-02 NOTE — ASSESSMENT & PLAN NOTE
Healed. Pt will start receiving diabetic foot care here to prevent cutting toes while trimming nails.

## 2025-07-02 NOTE — PROGRESS NOTES
JULISSA Harp   [facility]  [address]  [place] [address]     PATIENT NAME: Zahra Gandara  : 1970  DATE: 25  MRN: [id number]      Billing Provider: JULISSA Harp  Level of Service:   Patient PCP Information       Provider PCP Type    JULISSA Harp General            Reason for Visit / Chief Complaint: Health Maintenance (Hepatitis C Screening Never done- declined/Cervical Cancer Screening Never done/Foot Exam Never done- today/HIV Screening Never done- declined/TETANUS VACCINE due on 2000- today/Colorectal Cancer Screening Never done- 2025/Shingles Vaccine(1 of 2) Never done- not sure/COVID-19 Vaccine(5 - 2024-25 season) due on 2024- declined/Diabetic Eye Exam due on 05/10/2025 - 25) and Nail Care (Mrs.Cynthia SHREYA Gandara is a 54 y.o. female who presents to the clinic today for nail care . Says she cut her left great toe when trying to cut her nails at home.)   Health Maintenance Due   Topic Date Due    Hepatitis C Screening  Never done    Cervical Cancer Screening  Never done    HIV Screening  Never done    Colorectal Cancer Screening  Never done    Shingles Vaccine (1 of 2) Never done    COVID-19 Vaccine (5 - 2024-25 season) 2024    Diabetic Eye Exam  05/10/2025          Update PCP  Update Chief Complaint         History of Present Illness / Problem Focused Workflow     Zahra Gandara presents to the clinic for diabetic foot care. This is patient's first time for foot care. She has thick, long toe nails unable to be trimmed with standard clippers. Pt states she was trimming her own nails and would cut her toe so she has decided to come here to get nails trimmed. She has numbness to both feet at times. She has swelling to both legs at times. She has hx of lymphedema. She does not have a caregiver that can trim toe nails safely. She has never had diabetic shoes and is interested in trying these if insurance will cover. Explained process on  obtaining these through [facility]. Pt yareli. She also requests tetanus vaccine today as it has been over 10 years since she has had one and has cut her toe recently which is now healed. She denies any other needs at this time.     Hemoglobin A1C   Date Value Ref Range Status   04/22/2025 5.0 <=7.0 % Final     Comment:       Normal:               <5.7%  Pre-Diabetic:       5.7% to 6.4%  Diabetic:             >6.4%  Diabetic Goal:     <7%   10/22/2024 7.6 (H) 4.5 - 6.6 % Final     Comment:       Normal:               <5.7%  Pre-Diabetic:       5.7% to 6.4%  Diabetic:             >6.4%  Diabetic Goal:     <7%   02/06/2024 6.1 4.5 - 6.6 % Final     Comment:       Normal:               <5.7%  Pre-Diabetic:       5.7% to 6.4%  Diabetic:             >6.4%  Diabetic Goal:     <7%   05/18/2022 6.7 (H) 4.2 - 6.0 % Final     Comment:     For evaluation of glycemic control in known diabetic non-pregnant adults:      A1C <7% lower or higher target A1C values maybe appropriate for individual patients.  For the diagnosis of diabetes mellitus: A1C >/=6.5%  Increased risk for diabetes mellitus:   A1C 5.7-6.4%        CMP  Sodium   Date Value Ref Range Status   02/13/2025 144 136 - 145 mmol/L Final     Potassium   Date Value Ref Range Status   02/13/2025 3.5 3.5 - 5.1 mmol/L Final     Chloride   Date Value Ref Range Status   02/13/2025 106 98 - 107 mmol/L Final     CO2   Date Value Ref Range Status   02/13/2025 27 22 - 29 mmol/L Final     Glucose   Date Value Ref Range Status   02/13/2025 75 74 - 100 mg/dL Final     BUN   Date Value Ref Range Status   02/13/2025 6 (L) 10 - 20 mg/dL Final     Creatinine   Date Value Ref Range Status   02/13/2025 1.23 (H) 0.55 - 1.02 mg/dL Final     Calcium   Date Value Ref Range Status   02/13/2025 10.8 (H) 8.4 - 10.2 mg/dL Final     Total Protein   Date Value Ref Range Status   10/22/2024 7.1 6.4 - 8.2 g/dL Final     Albumin   Date Value Ref Range Status   02/13/2025 3.5 3.5 - 5.0 g/dL Final      Bilirubin, Total   Date Value Ref Range Status   10/22/2024 0.4 >0.0 - 1.2 mg/dL Final     Alk Phos   Date Value Ref Range Status   10/22/2024 72 41 - 108 U/L Final     AST   Date Value Ref Range Status   10/22/2024 15 15 - 37 U/L Final     ALT   Date Value Ref Range Status   10/22/2024 13 13 - 56 U/L Final     Anion Gap   Date Value Ref Range Status   02/13/2025 15 7 - 16 mmol/L Final     eGFR   Date Value Ref Range Status   02/13/2025 52 (L) >=60 mL/min/1.73m2 Final     Comment:     Estimated GFR calculated using the CKD-EPI creatinine (2021) equation.        Lab Results   Component Value Date    WBC 5.82 02/13/2025    RBC 5.81 (H) 02/13/2025    HGB 13.5 02/13/2025    HCT 43.6 02/13/2025    MCV 75.0 (L) 02/13/2025    MCH 23.2 (L) 02/13/2025    MCHC 31.0 (L) 02/13/2025    RDW 14.7 (H) 02/13/2025     02/13/2025    MPV 10.0 02/13/2025    LYMPH 37.1 02/13/2025    LYMPH 2.16 02/13/2025    MONO 8.4 (H) 02/13/2025    EOS 0.41 02/13/2025    BASO 0.06 02/13/2025    EOSINOPHIL 7.0 (H) 02/13/2025    BASOPHIL 1.0 02/13/2025        Lab Results   Component Value Date    CHOL 156 04/22/2025    CHOL 156 02/06/2024     Lab Results   Component Value Date    HDL 26 (L) 04/22/2025    HDL 37 (L) 02/06/2024     Lab Results   Component Value Date    LDLCALC 103 04/22/2025    LDLCALC 98 02/06/2024     Lab Results   Component Value Date    TRIG 134 04/22/2025    TRIG 107 02/06/2024     Lab Results   Component Value Date    CHOLHDL 6.0 04/22/2025    CHOLHDL 4.2 02/06/2024        Wt Readings from Last 3 Encounters:   07/02/25 1028 112.9 kg (249 lb)   06/11/25 1045 113.4 kg (250 lb)   04/22/25 0913 114.3 kg (252 lb)        BP Readings from Last 3 Encounters:   07/02/25 109/66   06/11/25 102/60   04/22/25 99/66        Review of Systems     Review of Systems   Constitutional: Negative.    Respiratory: Negative.     Cardiovascular:  Positive for leg swelling.   Gastrointestinal: Negative.    Endocrine: Negative.    Genitourinary:  Negative.    Musculoskeletal:  Positive for arthralgias and gait problem.   Integumentary:         Thick, long toe nails, dry skin, calluses to both feet   Allergic/Immunologic: Negative.    Hematological: Negative.    Psychiatric/Behavioral: Negative.          Medical / Social / Family History     Past Medical History:   Diagnosis Date    Collapse, lung     Diabetes mellitus, type 2     with Gastroparesis    Diabetic eye exam 05/10/2024    Dr. Jasmeet Ribera - [facility]    Edema     Gastroparesis     Low back pain     Sarcoidosis     Lung and Cutaneous Sarcoid    Thyroid disease     Hypothyroid       Past Surgical History:   Procedure Laterality Date    broken ankle repair      CHEST TUBE INSERTION         Social History  Ms.  reports that she has never smoked. She has never been exposed to tobacco smoke. She has never used smokeless tobacco. She reports that she does not currently use alcohol. She reports that she does not use drugs.    Family History  Ms.'s family history includes Diabetes in her mother, sister, and sister; Hypertension in her father and mother; No Known Problems in her brother and brother.    Medications and Allergies     Medications  Outpatient Medications Marked as Taking for the 7/2/25 encounter (Office Visit) with Lilly Haney FNP   Medication Sig Dispense Refill    cevimeline (EVOXAC) 30 mg capsule Take 1 capsule (30 mg total) by mouth 3 (three) times daily. 90 capsule 11    dapagliflozin propanediol (FARXIGA) 10 mg tablet Take 1 tablet (10 mg total) by mouth once daily. 90 tablet 3    ergocalciferol (ERGOCALCIFEROL) 50,000 unit Cap TAKE 1 CAPSULE BY MOUTH EVERY 7 DAYS FOR 12 DOSES 12 capsule 0    FLUoxetine (PROZAC) 40 MG capsule Take 1 capsule (40 mg total) by mouth once daily. 30 capsule 5    furosemide (LASIX) 20 MG tablet Take 1 tablet (20 mg total) by mouth daily as needed (swelling). 30 tablet 2    hydroxychloroquine (PLAQUENIL) 200 mg tablet Take 1 tablet (200 mg total) by  "mouth 2 (two) times daily. 60 tablet 8    levothyroxine (SYNTHROID) 50 MCG tablet Take 1 tablet (50 mcg total) by mouth before breakfast. 30 tablet 5    potassium chloride SA (K-DUR,KLOR-CON) 20 MEQ tablet Take 1 tablet (20 mEq total) by mouth 2 (two) times daily. 60 tablet 5    predniSONE (DELTASONE) 20 MG tablet TAKE 1 TABLET BY MOUTH ONCE DAILY . TAKE 1 TABLET EVERY EVEN DAY AND TAKE 1/2 A TABLET EVERY ODD DAY.       Current Facility-Administered Medications for the 7/2/25 encounter (Office Visit) with Lilly Haney FNP   Medication Dose Route Frequency Provider Last Rate Last Admin    [COMPLETED] Tdap vaccine injection 0.5 mL  0.5 mL Intramuscular 1 time in Clinic/HOD Lilly Haney FNP   0.5 mL at 07/02/25 1047       Allergies  Review of patient's allergies indicates:   Allergen Reactions    Bactrim [sulfamethoxazole-trimethoprim] Hives    Pcn [penicillins] Hives       Physical Examination     Vitals:    07/02/25 1028   BP: 109/66   Pulse: (!) 54   Resp: 18   Temp: 98.2 °F (36.8 °C)   TempSrc: Oral   SpO2: 97%   Weight: 112.9 kg (249 lb)   Height: 5' 4" (1.626 m)      Physical Exam  Constitutional:       Appearance: Normal appearance. She is obese.   HENT:      Head: Normocephalic.   Eyes:      Extraocular Movements: Extraocular movements intact.   Cardiovascular:      Rate and Rhythm: Normal rate and regular rhythm.      Pulses: Normal pulses.           Dorsalis pedis pulses are 2+ on the right side and 2+ on the left side.        Posterior tibial pulses are 2+ on the right side and 2+ on the left side.      Heart sounds: Normal heart sounds.      Comments: Trace edema to ble  Pulmonary:      Effort: Pulmonary effort is normal.      Breath sounds: Normal breath sounds.   Musculoskeletal:      Right lower leg: Edema present.      Left lower leg: Edema present.      Right foot: Normal range of motion. No deformity, bunion, Charcot foot, foot drop or prominent metatarsal heads.      Left foot: Normal range of " motion. No deformity, bunion, Charcot foot, foot drop or prominent metatarsal heads.        Feet:    Feet:      Right foot:      Protective Sensation: 10 sites tested.  8 sites sensed.      Skin integrity: Callus and dry skin present. No ulcer, blister, skin breakdown, erythema, warmth or fissure.      Toenail Condition: Right toenails are abnormally thick and long.      Left foot:      Protective Sensation: 10 sites tested.  9 sites sensed.      Skin integrity: Callus and dry skin present. No ulcer, blister, skin breakdown, erythema, warmth or fissure.      Toenail Condition: Left toenails are abnormally thick and long.      Comments: X= intact protective sensation  __= non-intact protective sensation  0= callus  Skin:     General: Skin is warm and dry.      Capillary Refill: Capillary refill takes less than 2 seconds.   Neurological:      General: No focal deficit present.      Mental Status: She is alert and oriented to person, place, and time.   Psychiatric:         Mood and Affect: Mood normal.         Behavior: Behavior normal.          Assessment and Plan (including Health Maintenance)      Problem List  Smart Sets  Document Outside HM   :    Plan:     There are no Patient Instructions on file for this visit.       Health Maintenance Due   Topic Date Due    Hepatitis C Screening  Never done    Cervical Cancer Screening  Never done    HIV Screening  Never done    Colorectal Cancer Screening  Never done    Shingles Vaccine (1 of 2) Never done    COVID-19 Vaccine (5 - 2024-25 season) 09/01/2024    Diabetic Eye Exam  05/10/2025       Problem List Items Addressed This Visit       Abrasion, left great toe, subsequent encounter    Current Assessment & Plan   Healed. Pt will start receiving diabetic foot care here to prevent cutting toes while trimming nails.          Relevant Medications    Tdap vaccine injection 0.5 mL (Completed)    Callus of foot    Current Assessment & Plan   See procedure note.           Relevant Orders    DIABETIC SHOES FOR HOME USE    Diabetic nephropathy associated with diabetes mellitus due to underlying condition - Primary    Current Assessment & Plan   Work on diet, specfically cutting back on bread, breaded things and cereal  Exercise at least 20-30 mins daily  Add free weights if you can even very light weight will help  Keep an on sugars, fasting sugar goal is , after eating no greater than 180  A1C goal is 7.0% or less          Relevant Orders    DIABETIC SHOES FOR HOME USE    Hereditary lymphedema    Current Assessment & Plan   Lymphedema is controlled. Current medical regimen is effective;   Will adjust according to results and monitor.          Relevant Orders    DIABETIC SHOES FOR HOME USE    Immunization due    Current Assessment & Plan   Tetanus vaccine given with no adverse reaction noted.          Relevant Medications    Tdap vaccine injection 0.5 mL (Completed)    Localized edema    Current Assessment & Plan   Edema is controlled. Current medical regimen is effective;   Will adjust according to results and monitor.          Relevant Orders    DIABETIC SHOES FOR HOME USE    Neuropathy    Current Assessment & Plan   Neuropathy is controlled. Current medical regimen is effective;   Will adjust according to results and monitor.          Relevant Orders    DIABETIC SHOES FOR HOME USE     Diabetic nephropathy associated with diabetes mellitus due to underlying condition  -     DIABETIC SHOES FOR HOME USE    Immunization due  -     Tdap vaccine injection 0.5 mL    Abrasion, left great toe, subsequent encounter  -     Tdap vaccine injection 0.5 mL    Hereditary lymphedema  -     DIABETIC SHOES FOR HOME USE    Localized edema  -     DIABETIC SHOES FOR HOME USE    Neuropathy  -     DIABETIC SHOES FOR HOME USE    Callus of foot  -     DIABETIC SHOES FOR HOME USE       Health Maintenance Topics with due status: Not Due       Topic Last Completion Date    Mammogram 10/29/2024    Diabetes  Urine Screening 02/13/2025    Influenza Vaccine 04/22/2025    Lipid Panel 04/22/2025    Hemoglobin A1c 04/22/2025    TETANUS VACCINE 07/02/2025    Foot Exam 07/02/2025    RSV Vaccine (Age 60+ and Pregnant patients) Not Due         Future Appointments   Date Time Provider Department Center   7/8/2025  1:50 PM [email or web address] [place]   8/13/2025  1:20 PM Radha Hobson FNP [facility]   9/4/2025  3:00 PM Lilly Haney FNP [facility]   3/3/2026  8:45 AM Mellisa Cortes MD [facility]   6/3/2026 11:00 AM AWV NURSE, [facility]        No follow-ups on file.    Health Maintenance Due   Topic Date Due    Hepatitis C Screening  Never done    Cervical Cancer Screening  Never done    HIV Screening  Never done    Colorectal Cancer Screening  Never done    Shingles Vaccine (1 of 2) Never done    COVID-19 Vaccine (5 - 2024-25 season) 09/01/2024    Diabetic Eye Exam  05/10/2025        Signature:  JULISSA Harp    Date of encounter: 7/2/25

## 2025-07-02 NOTE — ASSESSMENT & PLAN NOTE
Edema is controlled. Current medical regimen is effective;   Will adjust according to results and monitor.

## 2025-07-02 NOTE — ASSESSMENT & PLAN NOTE
Neuropathy is controlled. Current medical regimen is effective;   Will adjust according to results and monitor.

## 2025-07-05 DIAGNOSIS — E55.9 VITAMIN D DEFICIENCY, UNSPECIFIED: ICD-10-CM

## 2025-07-07 RX ORDER — ERGOCALCIFEROL 1.25 MG/1
50000 CAPSULE ORAL WEEKLY
Qty: 12 CAPSULE | Refills: 0 | Status: SHIPPED | OUTPATIENT
Start: 2025-07-07

## 2025-07-08 ENCOUNTER — OFFICE VISIT (OUTPATIENT)
Dept: PULMONOLOGY | Facility: CLINIC | Age: 55
End: 2025-07-08
Payer: MEDICARE

## 2025-07-08 VITALS
SYSTOLIC BLOOD PRESSURE: 124 MMHG | DIASTOLIC BLOOD PRESSURE: 74 MMHG | WEIGHT: 246.94 LBS | OXYGEN SATURATION: 96 % | BODY MASS INDEX: 42.16 KG/M2 | HEIGHT: 64 IN | HEART RATE: 59 BPM | RESPIRATION RATE: 16 BRPM

## 2025-07-08 DIAGNOSIS — D86.9 SARCOID: Primary | ICD-10-CM

## 2025-07-08 PROCEDURE — 3074F SYST BP LT 130 MM HG: CPT | Mod: CPTII,,, | Performed by: INTERNAL MEDICINE

## 2025-07-08 PROCEDURE — 3066F NEPHROPATHY DOC TX: CPT | Mod: CPTII,,, | Performed by: INTERNAL MEDICINE

## 2025-07-08 PROCEDURE — 3008F BODY MASS INDEX DOCD: CPT | Mod: CPTII,,, | Performed by: INTERNAL MEDICINE

## 2025-07-08 PROCEDURE — 99213 OFFICE O/P EST LOW 20 MIN: CPT | Mod: ,,, | Performed by: INTERNAL MEDICINE

## 2025-07-08 PROCEDURE — 3078F DIAST BP <80 MM HG: CPT | Mod: CPTII,,, | Performed by: INTERNAL MEDICINE

## 2025-07-08 PROCEDURE — 3044F HG A1C LEVEL LT 7.0%: CPT | Mod: CPTII,,, | Performed by: INTERNAL MEDICINE

## 2025-07-08 RX ORDER — ALBUTEROL SULFATE 90 UG/1
2 INHALANT RESPIRATORY (INHALATION) EVERY 6 HOURS PRN
Qty: 18 G | Refills: 5 | Status: SHIPPED | OUTPATIENT
Start: 2025-07-08

## 2025-07-08 RX ORDER — FLUTICASONE PROPIONATE AND SALMETEROL 250; 50 UG/1; UG/1
1 POWDER RESPIRATORY (INHALATION) 2 TIMES DAILY
Qty: 60 EACH | Refills: 5 | Status: SHIPPED | OUTPATIENT
Start: 2025-07-08 | End: 2026-07-08

## 2025-07-08 NOTE — PROGRESS NOTES
Subjective:       Patient ID: Zahra Gandara is a 54 y.o. female.    Chief Complaint: Sarcoidosis (3mo follow up.)    History of Present Illness    CHIEF COMPLAINT:  Ms. Gandara presents today for follow up.    RESPIRATORY:  She reports generally good breathing with increased difficulty during physical activity. She requires a rescue inhaler during strenuous movement but currently does not have one and needs refills. She is not using any baseline inhalers. She notes improvement in breathing which may correlate with recent reduction in blood sugar.    SARCOIDOSIS:  She continues management of sarcoidosis affecting her legs which typically worsens with swelling. She describes her overall condition as stable with intermittent leg involvement. She continues Plaquenil for leg symptoms and is currently taking prednisone 15 mg and 10 mg.    WEIGHT MANAGEMENT:  She reports weight fluctuating between 249-250 lbs, noting a decrease likely correlating with improved glycemic control.      ROS:  General: -fever, -chills, -fatigue, -weight gain, -weight loss  Eyes: -vision changes, -redness, -discharge  ENT: -ear pain, -nasal congestion, -sore throat  Cardiovascular: -chest pain, -palpitations, +lower extremity edema  Respiratory: -cough, -shortness of breath, +exertional dyspnea  Gastrointestinal: -abdominal pain, -nausea, -vomiting, -diarrhea, -constipation, -blood in stool  Genitourinary: -dysuria, -hematuria, -frequency  Musculoskeletal: -joint pain, -muscle pain, +limb swelling  Skin: -rash, -lesion  Neurological: -headache, -dizziness, -numbness, -tingling  Psychiatric: -anxiety, -depression, -sleep difficulty          Objective:      Physical Exam   Constitutional: She is oriented to person, place, and time. She appears well-developed and well-nourished.   HENT:   Head: Normocephalic.   Nose: Nose normal.   Mouth/Throat: Oropharynx is clear and moist.   Neck: No JVD present. No thyromegaly present.   Cardiovascular: Normal  "rate, regular rhythm, normal heart sounds and intact distal pulses.   Pulmonary/Chest: Normal expansion, hyperinflation, symmetric chest wall expansion, effort normal and breath sounds normal.   Abdominal: Soft. Bowel sounds are normal.   Musculoskeletal:         General: Normal range of motion.      Cervical back: Normal range of motion and neck supple.   Lymphadenopathy: No supraclavicular adenopathy is present.     She has no cervical adenopathy.   Neurological: She is alert and oriented to person, place, and time. She has normal reflexes.   Skin: Skin is warm and dry.   Psychiatric: She has a normal mood and affect. Her behavior is normal.     Personal Diagnostic Review  none pertinent        7/8/2025    12:54 PM 7/2/2025    10:28 AM 6/11/2025    10:45 AM 4/22/2025     9:13 AM 2/13/2025     1:48 PM 10/28/2024    11:08 AM 10/28/2024    11:05 AM   Pulmonary Function Tests   SpO2 96 % 97 % 98 % 97 % 98 %     Height 5' 4" (1.626 m) 5' 4" (1.626 m) 5' 4" (1.626 m) 5' 4" (1.626 m) 5' 4" (1.626 m) 5' 4" (1.626 m) 5' 4" (1.626 m)   Weight 112 kg (246 lb 14.6 oz) 112.9 kg (249 lb) 113.4 kg (250 lb) 114.3 kg (252 lb) 122.1 kg (269 lb 3.2 oz) 131.1 kg (289 lb) 131.1 kg (289 lb)   BMI (Calculated) 42.4 42.7 42.9 43.2 46.2 49.6 49.6           Current Medications[1]   Assessment:       No diagnosis found.    Encounter Medications[2]  No orders of the defined types were placed in this encounter.      Plan:       Problem List Items Addressed This Visit    None        Assessment & Plan    IMPRESSION:  - Assessed breathing and weight management progress.  - Evaluated current medication regimen for sarcoidosis, including Plaquenil and prednisone.  - Decreased prednisone from daily to 10 mg (half of 20 mg tablet) every other day.  - Initiated rescue inhaler for use during increased physical activity.  - Continued Advair for baseline breathing management.    ASTHMA:  - Decreased prednisone from daily to 10 mg (half of 20 mg tablet) " every other day.  - Initiated rescue inhaler for use during increased physical activity.  - Continued Advair for baseline breathing management.    FOLLOW-UP:  - Follow up in 3 months.            This note was generated with the assistance of ambient listening technology. Verbal consent was obtained by the patient and accompanying visitor(s) for the recording of patient appointment to facilitate this note. I attest to having reviewed and edited the generated note for accuracy, though some syntax or spelling errors may persist. Please contact the author of this note for any clarification.                  [1]   Current Outpatient Medications:     cevimeline (EVOXAC) 30 mg capsule, Take 1 capsule (30 mg total) by mouth 3 (three) times daily., Disp: 90 capsule, Rfl: 11    dapagliflozin propanediol (FARXIGA) 10 mg tablet, Take 1 tablet (10 mg total) by mouth once daily., Disp: 90 tablet, Rfl: 3    ergocalciferol (ERGOCALCIFEROL) 50,000 unit Cap, TAKE 1 CAPSULE BY MOUTH EVERY 7 DAYS FOR 12 DOSES, Disp: 12 capsule, Rfl: 0    FLUoxetine (PROZAC) 40 MG capsule, Take 1 capsule (40 mg total) by mouth once daily., Disp: 30 capsule, Rfl: 5    furosemide (LASIX) 20 MG tablet, Take 1 tablet (20 mg total) by mouth daily as needed (swelling)., Disp: 30 tablet, Rfl: 2    hydroxychloroquine (PLAQUENIL) 200 mg tablet, Take 1 tablet (200 mg total) by mouth 2 (two) times daily., Disp: 60 tablet, Rfl: 8    levothyroxine (SYNTHROID) 50 MCG tablet, Take 1 tablet (50 mcg total) by mouth before breakfast., Disp: 30 tablet, Rfl: 5    potassium chloride SA (K-DUR,KLOR-CON) 20 MEQ tablet, Take 1 tablet (20 mEq total) by mouth 2 (two) times daily., Disp: 60 tablet, Rfl: 5    predniSONE (DELTASONE) 20 MG tablet, TAKE 1 TABLET BY MOUTH ONCE DAILY . TAKE 1 TABLET EVERY EVEN DAY AND TAKE 1/2 A TABLET EVERY ODD DAY., Disp: , Rfl:     albuterol (VENTOLIN HFA) 90 mcg/actuation inhaler, Inhale 2 puffs into the lungs every 6 (six) hours as needed for  Wheezing. Rescue, Disp: 18 g, Rfl: 5    fluticasone-salmeterol diskus inhaler 250-50 mcg, Inhale 1 puff into the lungs 2 (two) times daily. Controller, Disp: 60 each, Rfl: 5    metoclopramide HCl 5 mg TbDL, Take 10 mg by mouth 2 (two) times a day. (Patient not taking: Reported on 6/11/2025), Disp: 60 tablet, Rfl: 5  [2]   Outpatient Encounter Medications as of 7/8/2025   Medication Sig Dispense Refill    cevimeline (EVOXAC) 30 mg capsule Take 1 capsule (30 mg total) by mouth 3 (three) times daily. 90 capsule 11    dapagliflozin propanediol (FARXIGA) 10 mg tablet Take 1 tablet (10 mg total) by mouth once daily. 90 tablet 3    ergocalciferol (ERGOCALCIFEROL) 50,000 unit Cap TAKE 1 CAPSULE BY MOUTH EVERY 7 DAYS FOR 12 DOSES 12 capsule 0    FLUoxetine (PROZAC) 40 MG capsule Take 1 capsule (40 mg total) by mouth once daily. 30 capsule 5    furosemide (LASIX) 20 MG tablet Take 1 tablet (20 mg total) by mouth daily as needed (swelling). 30 tablet 2    hydroxychloroquine (PLAQUENIL) 200 mg tablet Take 1 tablet (200 mg total) by mouth 2 (two) times daily. 60 tablet 8    levothyroxine (SYNTHROID) 50 MCG tablet Take 1 tablet (50 mcg total) by mouth before breakfast. 30 tablet 5    potassium chloride SA (K-DUR,KLOR-CON) 20 MEQ tablet Take 1 tablet (20 mEq total) by mouth 2 (two) times daily. 60 tablet 5    predniSONE (DELTASONE) 20 MG tablet TAKE 1 TABLET BY MOUTH ONCE DAILY . TAKE 1 TABLET EVERY EVEN DAY AND TAKE 1/2 A TABLET EVERY ODD DAY.      albuterol (VENTOLIN HFA) 90 mcg/actuation inhaler Inhale 2 puffs into the lungs every 6 (six) hours as needed for Wheezing. Rescue 18 g 5    fluticasone-salmeterol diskus inhaler 250-50 mcg Inhale 1 puff into the lungs 2 (two) times daily. Controller 60 each 5    metoclopramide HCl 5 mg TbDL Take 10 mg by mouth 2 (two) times a day. (Patient not taking: Reported on 6/11/2025) 60 tablet 5    [DISCONTINUED] ergocalciferol (ERGOCALCIFEROL) 50,000 unit Cap TAKE 1 CAPSULE BY MOUTH EVERY 7  DAYS FOR 12 DOSES 12 capsule 0    [DISCONTINUED] FLUoxetine 20 MG capsule Take 1 capsule (20 mg total) by mouth once daily. 30 capsule 5    [DISCONTINUED] fluticasone-salmeterol diskus inhaler 250-50 mcg Inhale 1 puff into the lungs 2 (two) times daily. Controller (Patient taking differently: Inhale 1 puff into the lungs 2 (two) times daily. Controller  Takes once daily) 60 each 5     No facility-administered encounter medications on file as of 7/8/2025.

## 2025-07-31 ENCOUNTER — EXTERNAL CHRONIC CARE MANAGEMENT (OUTPATIENT)
Dept: FAMILY MEDICINE | Facility: CLINIC | Age: 55
End: 2025-07-31
Payer: MEDICARE

## 2025-07-31 PROCEDURE — 99439 CHRNC CARE MGMT STAF EA ADDL: CPT | Mod: ,,, | Performed by: NURSE PRACTITIONER

## 2025-07-31 PROCEDURE — 99490 CHRNC CARE MGMT STAFF 1ST 20: CPT | Mod: ,,, | Performed by: NURSE PRACTITIONER

## 2025-08-11 RX ORDER — ISOPROPYL ALCOHOL 70 ML/100ML
1 SWAB TOPICAL 2 TIMES DAILY
Qty: 400 EACH | Refills: 3 | Status: SHIPPED | OUTPATIENT
Start: 2025-08-11

## 2025-08-11 RX ORDER — LANCETS
1 EACH MISCELLANEOUS 2 TIMES DAILY
Qty: 200 EACH | Refills: 3 | Status: SHIPPED | OUTPATIENT
Start: 2025-08-11

## 2025-08-11 RX ORDER — DEXTROSE 4 G
1 TABLET,CHEWABLE ORAL 2 TIMES DAILY
Qty: 1 EACH | Refills: 0 | Status: SHIPPED | OUTPATIENT
Start: 2025-08-11

## 2025-09-04 ENCOUNTER — OFFICE VISIT (OUTPATIENT)
Dept: NEPHROLOGY | Facility: CLINIC | Age: 55
End: 2025-09-04
Payer: MEDICARE

## 2025-09-04 VITALS
RESPIRATION RATE: 18 BRPM | SYSTOLIC BLOOD PRESSURE: 110 MMHG | HEART RATE: 78 BPM | DIASTOLIC BLOOD PRESSURE: 80 MMHG | OXYGEN SATURATION: 98 % | WEIGHT: 241 LBS | HEIGHT: 64 IN | BODY MASS INDEX: 41.15 KG/M2

## 2025-09-04 DIAGNOSIS — E11.21 DIABETIC NEPHROPATHY ASSOCIATED WITH TYPE 2 DIABETES MELLITUS: ICD-10-CM

## 2025-09-04 DIAGNOSIS — N18.31 STAGE 3A CHRONIC KIDNEY DISEASE: Primary | ICD-10-CM

## 2025-09-04 PROCEDURE — 3074F SYST BP LT 130 MM HG: CPT | Mod: CPTII,,, | Performed by: NURSE PRACTITIONER

## 2025-09-04 PROCEDURE — 99215 OFFICE O/P EST HI 40 MIN: CPT | Mod: PBBFAC | Performed by: NURSE PRACTITIONER

## 2025-09-04 PROCEDURE — 3008F BODY MASS INDEX DOCD: CPT | Mod: CPTII,,, | Performed by: NURSE PRACTITIONER

## 2025-09-04 PROCEDURE — 1159F MED LIST DOCD IN RCRD: CPT | Mod: CPTII,,, | Performed by: NURSE PRACTITIONER

## 2025-09-04 PROCEDURE — 3079F DIAST BP 80-89 MM HG: CPT | Mod: CPTII,,, | Performed by: NURSE PRACTITIONER

## 2025-09-04 PROCEDURE — 99999 PR PBB SHADOW E&M-EST. PATIENT-LVL V: CPT | Mod: PBBFAC,,, | Performed by: NURSE PRACTITIONER

## 2025-09-04 PROCEDURE — 99213 OFFICE O/P EST LOW 20 MIN: CPT | Mod: S$PBB,,, | Performed by: NURSE PRACTITIONER

## 2025-09-04 PROCEDURE — 3044F HG A1C LEVEL LT 7.0%: CPT | Mod: CPTII,,, | Performed by: NURSE PRACTITIONER

## 2025-09-04 PROCEDURE — 3066F NEPHROPATHY DOC TX: CPT | Mod: CPTII,,, | Performed by: NURSE PRACTITIONER
